# Patient Record
Sex: MALE | Race: WHITE | Employment: FULL TIME | URBAN - NONMETROPOLITAN AREA
[De-identification: names, ages, dates, MRNs, and addresses within clinical notes are randomized per-mention and may not be internally consistent; named-entity substitution may affect disease eponyms.]

---

## 2017-01-17 ENCOUNTER — OFFICE VISIT (OUTPATIENT)
Dept: INTERNAL MEDICINE | Age: 42
End: 2017-01-17

## 2017-01-17 VITALS
TEMPERATURE: 97.1 F | HEIGHT: 72 IN | SYSTOLIC BLOOD PRESSURE: 132 MMHG | BODY MASS INDEX: 35.08 KG/M2 | WEIGHT: 259 LBS | HEART RATE: 72 BPM | DIASTOLIC BLOOD PRESSURE: 80 MMHG

## 2017-01-17 DIAGNOSIS — R30.0 DYSURIA: Primary | ICD-10-CM

## 2017-01-17 DIAGNOSIS — R10.819 SUPRAPUBIC TENDERNESS: ICD-10-CM

## 2017-01-17 LAB
-: ABNORMAL
AMORPHOUS: ABNORMAL
BACTERIA: ABNORMAL
BILIRUBIN URINE: NEGATIVE
CASTS UA: ABNORMAL /LPF (ref 0–2)
COLOR: ABNORMAL
COMMENT UA: ABNORMAL
CRYSTALS, UA: ABNORMAL /HPF
EPITHELIAL CELLS UA: ABNORMAL /HPF (ref 0–5)
GLUCOSE URINE: NEGATIVE
KETONES, URINE: NEGATIVE
LEUKOCYTE ESTERASE, URINE: NEGATIVE
MUCUS: ABNORMAL
NITRITE, URINE: NEGATIVE
OTHER OBSERVATIONS UA: ABNORMAL
PH UA: 5.5 (ref 5–6)
PROTEIN UA: NEGATIVE
RBC UA: ABNORMAL /HPF (ref 0–4)
RENAL EPITHELIAL, UA: ABNORMAL /HPF
SPECIFIC GRAVITY UA: 1.03 (ref 1.01–1.02)
TRICHOMONAS: ABNORMAL
TURBIDITY: ABNORMAL
URINE HGB: ABNORMAL
UROBILINOGEN, URINE: NORMAL
WBC UA: ABNORMAL /HPF (ref 0–4)
YEAST: ABNORMAL

## 2017-01-17 PROCEDURE — 81001 URINALYSIS AUTO W/SCOPE: CPT | Performed by: NURSE PRACTITIONER

## 2017-01-17 PROCEDURE — 99213 OFFICE O/P EST LOW 20 MIN: CPT | Performed by: NURSE PRACTITIONER

## 2017-01-17 RX ORDER — CIPROFLOXACIN 250 MG/1
250 TABLET, FILM COATED ORAL 2 TIMES DAILY
Qty: 10 TABLET | Refills: 0 | Status: SHIPPED | OUTPATIENT
Start: 2017-01-17 | End: 2017-01-22

## 2017-01-18 ASSESSMENT — ENCOUNTER SYMPTOMS
ABDOMINAL PAIN: 1
VOMITING: 0
RECTAL PAIN: 0
NAUSEA: 0
SHORTNESS OF BREATH: 0
ANAL BLEEDING: 0
ABDOMINAL DISTENTION: 0
CONSTIPATION: 0
DIARRHEA: 0
BLOOD IN STOOL: 0

## 2017-05-05 RX ORDER — DULOXETIN HYDROCHLORIDE 30 MG/1
CAPSULE, DELAYED RELEASE ORAL
Qty: 90 CAPSULE | Refills: 3 | Status: SHIPPED | OUTPATIENT
Start: 2017-05-05 | End: 2017-06-30 | Stop reason: SDUPTHER

## 2017-06-27 LAB
ALBUMIN SERPL-MCNC: 4.5 G/DL (ref 3.5–5.2)
ALBUMIN/GLOBULIN RATIO: 1.6 (ref 1–2.5)
ALP BLD-CCNC: 80 U/L (ref 40–129)
ALT SERPL-CCNC: 33 U/L (ref 5–41)
ANION GAP SERPL CALCULATED.3IONS-SCNC: 8 MMOL/L (ref 9–17)
AST SERPL-CCNC: 24 U/L
BILIRUB SERPL-MCNC: 0.71 MG/DL (ref 0.3–1.2)
BUN BLDV-MCNC: 17 MG/DL (ref 6–20)
BUN/CREAT BLD: 20 (ref 9–20)
CALCIUM SERPL-MCNC: 9.8 MG/DL (ref 8.6–10.4)
CHLORIDE BLD-SCNC: 103 MMOL/L (ref 98–107)
CO2: 32 MMOL/L (ref 20–31)
CREAT SERPL-MCNC: 0.84 MG/DL (ref 0.7–1.2)
ESTIMATED AVERAGE GLUCOSE: 105 MG/DL
GFR AFRICAN AMERICAN: >60 ML/MIN
GFR NON-AFRICAN AMERICAN: >60 ML/MIN
GFR SERPL CREATININE-BSD FRML MDRD: ABNORMAL ML/MIN/{1.73_M2}
GFR SERPL CREATININE-BSD FRML MDRD: ABNORMAL ML/MIN/{1.73_M2}
GLUCOSE BLD-MCNC: 112 MG/DL (ref 70–99)
HBA1C MFR BLD: 5.3 % (ref 4.8–5.9)
POTASSIUM SERPL-SCNC: 5.2 MMOL/L (ref 3.7–5.3)
SODIUM BLD-SCNC: 143 MMOL/L (ref 135–144)
T3 FREE: 2.98 PG/ML (ref 2.02–4.43)
THYROXINE, FREE: 0.9 NG/DL (ref 0.93–1.7)
TOTAL PROTEIN: 7.3 G/DL (ref 6.4–8.3)
TSH SERPL DL<=0.05 MIU/L-ACNC: 1.39 MIU/L (ref 0.3–5)

## 2017-06-30 ENCOUNTER — OFFICE VISIT (OUTPATIENT)
Dept: INTERNAL MEDICINE | Age: 42
End: 2017-06-30
Payer: COMMERCIAL

## 2017-06-30 VITALS
HEART RATE: 60 BPM | WEIGHT: 251.4 LBS | HEIGHT: 72 IN | SYSTOLIC BLOOD PRESSURE: 122 MMHG | BODY MASS INDEX: 34.05 KG/M2 | DIASTOLIC BLOOD PRESSURE: 76 MMHG

## 2017-06-30 DIAGNOSIS — R73.01 IMPAIRED FASTING GLUCOSE: ICD-10-CM

## 2017-06-30 DIAGNOSIS — F32.9 MAJOR DEPRESSION, CHRONIC: Primary | ICD-10-CM

## 2017-06-30 DIAGNOSIS — E66.9 OBESITY (BMI 30-39.9): ICD-10-CM

## 2017-06-30 DIAGNOSIS — E55.9 VITAMIN D DEFICIENCY: ICD-10-CM

## 2017-06-30 DIAGNOSIS — E04.1 THYROID NODULE: ICD-10-CM

## 2017-06-30 DIAGNOSIS — Z98.84 STATUS POST BARIATRIC SURGERY: ICD-10-CM

## 2017-06-30 DIAGNOSIS — E78.2 MIXED HYPERLIPIDEMIA: ICD-10-CM

## 2017-06-30 PROCEDURE — 99214 OFFICE O/P EST MOD 30 MIN: CPT | Performed by: INTERNAL MEDICINE

## 2017-06-30 RX ORDER — DULOXETIN HYDROCHLORIDE 60 MG/1
60 CAPSULE, DELAYED RELEASE ORAL DAILY
Qty: 90 CAPSULE | Refills: 3 | Status: SHIPPED | OUTPATIENT
Start: 2017-06-30 | End: 2018-07-09

## 2017-06-30 ASSESSMENT — PATIENT HEALTH QUESTIONNAIRE - PHQ9
SUM OF ALL RESPONSES TO PHQ9 QUESTIONS 1 & 2: 2
2. FEELING DOWN, DEPRESSED OR HOPELESS: 1
SUM OF ALL RESPONSES TO PHQ QUESTIONS 1-9: 2
1. LITTLE INTEREST OR PLEASURE IN DOING THINGS: 1

## 2017-07-01 ASSESSMENT — ENCOUNTER SYMPTOMS
DOUBLE VISION: 0
EYE DISCHARGE: 0
DIARRHEA: 0
BLOOD IN STOOL: 0
SHORTNESS OF BREATH: 0
COUGH: 0
EYE PAIN: 0
BLURRED VISION: 0
SORE THROAT: 0
CONSTIPATION: 0
NAUSEA: 0
ABDOMINAL PAIN: 0
VOMITING: 0
WHEEZING: 0

## 2017-08-11 ENCOUNTER — OFFICE VISIT (OUTPATIENT)
Dept: PRIMARY CARE CLINIC | Age: 42
End: 2017-08-11
Payer: COMMERCIAL

## 2017-08-11 VITALS
TEMPERATURE: 98.7 F | BODY MASS INDEX: 34.27 KG/M2 | HEART RATE: 104 BPM | SYSTOLIC BLOOD PRESSURE: 122 MMHG | WEIGHT: 253 LBS | HEIGHT: 72 IN | OXYGEN SATURATION: 96 % | DIASTOLIC BLOOD PRESSURE: 82 MMHG

## 2017-08-11 DIAGNOSIS — S99.922A FOOT INJURY, LEFT, INITIAL ENCOUNTER: Primary | ICD-10-CM

## 2017-08-11 DIAGNOSIS — M79.672 FOOT PAIN, LEFT: ICD-10-CM

## 2017-08-11 PROCEDURE — 99213 OFFICE O/P EST LOW 20 MIN: CPT | Performed by: PHYSICIAN ASSISTANT

## 2017-08-14 ASSESSMENT — ENCOUNTER SYMPTOMS
SHORTNESS OF BREATH: 0
COLOR CHANGE: 0
COUGH: 0
NAUSEA: 0

## 2017-10-30 ENCOUNTER — TELEPHONE (OUTPATIENT)
Dept: INTERNAL MEDICINE | Age: 42
End: 2017-10-30

## 2017-10-30 DIAGNOSIS — Z23 NEED FOR HEPATITIS B VACCINATION: Primary | ICD-10-CM

## 2017-10-30 NOTE — TELEPHONE ENCOUNTER
Patient is needing the Hepatitis B series done for school. Please place order for patient to get done.

## 2017-12-22 ENCOUNTER — OFFICE VISIT (OUTPATIENT)
Dept: INTERNAL MEDICINE | Age: 42
End: 2017-12-22
Payer: COMMERCIAL

## 2017-12-22 VITALS
HEART RATE: 100 BPM | HEIGHT: 72 IN | WEIGHT: 265.4 LBS | SYSTOLIC BLOOD PRESSURE: 132 MMHG | BODY MASS INDEX: 35.95 KG/M2 | DIASTOLIC BLOOD PRESSURE: 80 MMHG

## 2017-12-22 DIAGNOSIS — E78.2 MIXED HYPERLIPIDEMIA: ICD-10-CM

## 2017-12-22 DIAGNOSIS — R41.3 MEMORY CHANGE: ICD-10-CM

## 2017-12-22 DIAGNOSIS — L40.9 PSORIASIS: ICD-10-CM

## 2017-12-22 DIAGNOSIS — E29.1 HYPOGONADISM MALE: ICD-10-CM

## 2017-12-22 DIAGNOSIS — R73.01 IMPAIRED FASTING GLUCOSE: ICD-10-CM

## 2017-12-22 DIAGNOSIS — E04.1 THYROID NODULE: ICD-10-CM

## 2017-12-22 DIAGNOSIS — F32.9 MAJOR DEPRESSION, CHRONIC: Primary | ICD-10-CM

## 2017-12-22 DIAGNOSIS — E55.9 VITAMIN D DEFICIENCY: ICD-10-CM

## 2017-12-22 DIAGNOSIS — Z98.84 STATUS POST BARIATRIC SURGERY: ICD-10-CM

## 2017-12-22 DIAGNOSIS — F32.A DEPRESSION, UNSPECIFIED DEPRESSION TYPE: Primary | ICD-10-CM

## 2017-12-22 PROCEDURE — 99214 OFFICE O/P EST MOD 30 MIN: CPT | Performed by: INTERNAL MEDICINE

## 2017-12-22 RX ORDER — BETAMETHASONE DIPROPIONATE 0.5 MG/G
LOTION TOPICAL
Qty: 60 ML | Refills: 0 | Status: SHIPPED | OUTPATIENT
Start: 2017-12-22 | End: 2019-02-07 | Stop reason: SDUPTHER

## 2017-12-26 NOTE — PROGRESS NOTES
Elba Bustos  YOB: 1975    Date of Service:  12/22/2017      HPI:  Annalise Gleason was seen in the internal medicine office today for:  Chief Complaint  Patient presents with  · Trouble with his mood, trouble with his focus or attention, trouble with his memory  · Blood sugar problem. · Psoriasis. · Weight problem. · and continued evaluation and management of chronic medical problems. We addressed the following new, acute or uncontrolled/unstable issues:    1. He has had trouble with depression and has been treated with Cymbalta. He thinks the Cymbalta, bumping it to 60, probably helped some but he is definitely not as good as he would like. In addition, he has noted some trouble with focus or attention and memory. He finds it frustrating. Much of the time his mood is pretty good but he fluctuates somewhat. This seems to have been building over the past 6 months or so. He would consider this to be a mild perhaps moderate problem. It does not seem to be improving and he has not really noticed anything that makes it better or worse. 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 2.  He has had more trouble with his psoriasis on his scalp. The dermatologist had given him a cream and a lotion which we refilled for him today but if this is not helping we talked about dermatology followup. We addressed the following chronic issues:  1. We talked about his weight and the weight loss surgery, his blood sugar and cholesterol. He has been trying to work on his diet. 2.  He is following with the endocrinologist with regard to the thyroid nodule and the vitamin D deficiency and the hypogonadism and that seems to be doing okay. Review of Systems:  Constitutional:  Negative for chills, fever, and weight loss. HENT:  Negative for congestion, ear pain, and sore throat.   Eyes:  Negative for blurred vision, double vision, pain and 9/6/13    preop wt 338.2lb    GERD (gastroesophageal reflux disease)     1) EGD 06/08 with minimal esophagitis, not confirmed with biopsy.  Hyperlipidemia     2% risk over 10 years on calculator December 2014    Hypertension     Hypogonadism male     1) Erectile dysfunction. 2) Gynecomastia. 3) Dr. Nivia Zhong, endocrinology, [facility] evaluation 07/08. 4) MRI of head 08/06.  Impaired fasting glucose     Obesity     BMI 45, 06/08., Bariatric surg 9/13    Obstructive sleep apnea     11/08, CPAP 10.- noncompliant 2016    Psoriasis     Treated with topical steroids Dr Rafi Parker   Betamethasone cream and lotion    Sensorineural hearing loss     1) Dr. Yecenia Ferguson evaluation, 08/06. MRI of head negative 08/06.  Thyroid nodule     right sided, fine needle aspiration 01/09 by Dr. Nivia Zhong negative. 1) Repeat ultrasound 06/10 stable  Dr. Nivia Zhong. 2) Fine needle aspiration repeat 10/11, negative.  Vitamin D deficiency     (Dr. Nivia Zhong). Family Hx and Social Hx    family history includes Arthritis in his maternal grandmother and mother; Asthma in his brother; Cancer in his sister; Depression in his mother; Diabetes in his maternal aunt and mother; Glaucoma in an other family member; Hearing Loss in his paternal grandfather; Heart Disease in his maternal grandfather and mother; High Blood Pressure in his maternal grandfather, maternal uncle, and mother; High Cholesterol in his maternal aunt, maternal uncle, and mother; Other in his mother; Thyroid Disease in his mother. History   Smoking Status    Never Smoker   Smokeless Tobacco    Never Used     History   Alcohol Use No     Comment: occassional, pt willing to avoid for at least 6 month post bariatric surgery       Physical Examination:  Constitutional:  He appears well-developed and well-nourished. No distress. HENT:  Head: Normocephalic and atraumatic.   Right Ear:  External ear normal.  Left Ear:  External ear normal.  Nose:  Nose MG 2.2 06/27/2017    VITD25 59.4 06/27/2017    IRON 115 06/27/2017    TIBC 288 06/27/2017     Lab Results   Component Value Date    CHOL 211 06/27/2017    TRIG 126 06/27/2017    HDL 49 06/27/2017    LDLCHOLESTEROL 137 06/27/2017       Assessment/Plan:    1. Major depression, chronic  2. Memory change  Some issues with his mood but he is having trouble with focus or attention and memory. I wonder a little bit about attention deficit. I think we should get psychiatry input. He is receptive to this. We are going to set him up in [place] for review of these symptoms before making any other changes. The Mini-Mental Status exam was completely normal.  We could consider neurocognitive testing but we are going to see how the psychiatrist feels initially. 3. Impaired fasting glucose  Working on diet. Blood sugar was 112. Needs to work aggressively on this and we will continue to monitor.  - Basic Metabolic Panel; Future    4. Status post bariatric surgery  His weight has increased. He needs to try to continue to focus on diet and we will monitor. He also has follow up with Dr. Emiliano Yan. - Vitamin D 25 Hydroxy; Future  - Vitamin B12; Future  - Vitamin A; Future  - Vitamin B1; Future    5. Psoriasis  I refilled the medications. If he is not making progress here we will need to get him back and see Dermatology. - betamethasone dipropionate 0.05 % lotion; APPLY TO AFFECTED AREA TWICE A DAY AS NEEDED  Dispense: 60 mL; Refill: 0  - betamethasone valerate (VALISONE) 0.1 % cream; APPLY TOPICALLY 2 TIMES DAILY AS NEEDED  Dispense: 45 g; Refill: 0    6. Thyroid nodule  7. Vitamin D deficiency  8. Hypogonadism male  Appears stable from this standpoint. He has follow up with Dr. Maria De Jesus Carbone. 9. Mixed hyperlipidemia  10-year risk is around 2%. He is working on diet. He will call if any problems prior to return.     Orders Placed This Encounter   Medications    betamethasone dipropionate 0.05 % lotion     Sig: APPLY TO AFFECTED AREA TWICE A DAY AS NEEDED     Dispense:  60 mL     Refill:  0    betamethasone valerate (VALISONE) 0.1 % cream     Sig: APPLY TOPICALLY 2 TIMES DAILY AS NEEDED     Dispense:  45 g     Refill:  0     Susanne TY am scribing for Julio Cesar Hidalgo MD 12/26/2017 at 11:35 AM.

## 2018-02-12 ENCOUNTER — PATIENT MESSAGE (OUTPATIENT)
Dept: INTERNAL MEDICINE | Age: 43
End: 2018-02-12

## 2018-02-12 RX ORDER — VALACYCLOVIR HYDROCHLORIDE 1 G/1
2 TABLET, FILM COATED ORAL 2 TIMES DAILY
Qty: 32 TABLET | Refills: 3 | Status: SHIPPED | OUTPATIENT
Start: 2018-02-12 | End: 2018-07-19

## 2018-02-12 NOTE — TELEPHONE ENCOUNTER
Charles Lyn To Sent  2/12/2018  1:10 PM   I would like to get a refill on Valtrex 1gm for cold sores.  If possible could I have a script for #32  with directions of 2 tablets every 12 hours for 2 doses.  That prescription [facility] be sent to [facility].  If you have any questions please contact my wife Brandon Haile) in the clinic pharmacy. John Maddox you.

## 2018-05-10 ENCOUNTER — HOSPITAL ENCOUNTER (OUTPATIENT)
Dept: LAB | Age: 43
Setting detail: SPECIMEN
Discharge: HOME | End: 2018-05-10
Payer: COMMERCIAL

## 2018-05-10 DIAGNOSIS — Z98.84 STATUS POST BARIATRIC SURGERY: ICD-10-CM

## 2018-05-10 DIAGNOSIS — R73.01 IMPAIRED FASTING GLUCOSE: ICD-10-CM

## 2018-05-10 LAB
ANION GAP SERPL CALCULATED.3IONS-SCNC: 9 MMOL/L (ref 9–17)
BUN BLDV-MCNC: 18 MG/DL (ref 6–20)
BUN/CREAT BLD: 21 (ref 9–20)
CALCIUM SERPL-MCNC: 9.6 MG/DL (ref 8.6–10.4)
CHLORIDE BLD-SCNC: 101 MMOL/L (ref 98–107)
CO2: 31 MMOL/L (ref 20–31)
CREAT SERPL-MCNC: 0.85 MG/DL (ref 0.7–1.2)
GFR AFRICAN AMERICAN: >60 ML/MIN
GFR NON-AFRICAN AMERICAN: >60 ML/MIN
GFR SERPL CREATININE-BSD FRML MDRD: ABNORMAL ML/MIN/{1.73_M2}
GFR SERPL CREATININE-BSD FRML MDRD: ABNORMAL ML/MIN/{1.73_M2}
GLUCOSE BLD-MCNC: 110 MG/DL (ref 70–99)
POTASSIUM SERPL-SCNC: 4.5 MMOL/L (ref 3.7–5.3)
SODIUM BLD-SCNC: 141 MMOL/L (ref 135–144)
VITAMIN B-12: >2000 PG/ML (ref 232–1245)
VITAMIN D 25-HYDROXY: 46.5 NG/ML (ref 30–100)

## 2018-05-10 PROCEDURE — 80048 BASIC METABOLIC PNL TOTAL CA: CPT

## 2018-05-10 PROCEDURE — 82607 VITAMIN B-12: CPT

## 2018-05-10 PROCEDURE — 36415 COLL VENOUS BLD VENIPUNCTURE: CPT

## 2018-05-10 PROCEDURE — 84425 ASSAY OF VITAMIN B-1: CPT

## 2018-05-10 PROCEDURE — 84590 ASSAY OF VITAMIN A: CPT

## 2018-05-10 PROCEDURE — 82306 VITAMIN D 25 HYDROXY: CPT

## 2018-05-13 LAB
RETINYL PALMITATE: <0.02 MG/L (ref 0–0.1)
VITAMIN A LEVEL: 0.43 MG/L (ref 0.3–1.2)
VITAMIN A, INTERP: NORMAL

## 2018-05-15 LAB — VITAMIN B1 WHOLE BLOOD: 132 NMOL/L (ref 70–180)

## 2018-05-17 DIAGNOSIS — L40.9 PSORIASIS: ICD-10-CM

## 2018-05-18 RX ORDER — BETAMETHASONE DIPROPIONATE 0.5 MG/G
LOTION TOPICAL
Qty: 60 ML | Refills: 0 | OUTPATIENT
Start: 2018-05-18

## 2018-07-09 RX ORDER — DULOXETIN HYDROCHLORIDE 30 MG/1
CAPSULE, DELAYED RELEASE ORAL
Qty: 180 CAPSULE | Refills: 3 | Status: SHIPPED | OUTPATIENT
Start: 2018-07-09 | End: 2019-05-23 | Stop reason: SDUPTHER

## 2018-07-19 ENCOUNTER — OFFICE VISIT (OUTPATIENT)
Dept: OPHTHALMOLOGY | Age: 43
End: 2018-07-19
Payer: COMMERCIAL

## 2018-07-19 DIAGNOSIS — B00.52 HERPES SIMPLEX VIRUS (HSV) EPITHELIAL KERATITIS: Primary | ICD-10-CM

## 2018-07-19 PROCEDURE — 99214 OFFICE O/P EST MOD 30 MIN: CPT | Performed by: OPHTHALMOLOGY

## 2018-07-19 RX ORDER — VALACYCLOVIR HYDROCHLORIDE 500 MG/1
1000 TABLET, FILM COATED ORAL DAILY
Qty: 30 TABLET | Refills: 5 | Status: SHIPPED | OUTPATIENT
Start: 2018-07-19 | End: 2018-08-07 | Stop reason: ALTCHOICE

## 2018-07-19 ASSESSMENT — SLIT LAMP EXAM - LIDS
COMMENTS: MILD BLEPHARITIS. MILD MGD
COMMENTS: MILD BLEPHARITIS. MILD MGD

## 2018-07-19 ASSESSMENT — TONOMETRY
OD_IOP_MMHG: 14
OS_IOP_MMHG: 14

## 2018-07-19 ASSESSMENT — VISUAL ACUITY
METHOD: SNELLEN - LINEAR
OS_CC: 20/20
CORRECTION_TYPE: GLASSES

## 2018-07-19 NOTE — PROGRESS NOTES
5 times a day. Aggressive lubrication. Follow.     Electronically signed by Rosaline Fagan MD on 7/19/2018 at 2:49 PM

## 2018-07-24 ENCOUNTER — OFFICE VISIT (OUTPATIENT)
Dept: OPHTHALMOLOGY | Age: 43
End: 2018-07-24
Payer: COMMERCIAL

## 2018-07-24 DIAGNOSIS — B00.52 HERPES SIMPLEX VIRUS (HSV) EPITHELIAL KERATITIS: Primary | ICD-10-CM

## 2018-07-24 PROCEDURE — 99213 OFFICE O/P EST LOW 20 MIN: CPT | Performed by: OPHTHALMOLOGY

## 2018-07-24 ASSESSMENT — TONOMETRY
IOP_METHOD: NON-CONTACT AIR PUFF
OS_IOP_MMHG: 13
OD_IOP_MMHG: 14

## 2018-07-24 ASSESSMENT — SLIT LAMP EXAM - LIDS
COMMENTS: MILD BLEPHARITIS. MILD MGD
COMMENTS: MILD BLEPHARITIS. MILD MGD

## 2018-07-24 ASSESSMENT — VISUAL ACUITY
CORRECTION_TYPE: GLASSES
METHOD: SNELLEN - LINEAR
OS_CC: 20/20

## 2018-07-24 NOTE — PROGRESS NOTES
nodule     right sided, fine needle aspiration 01/09 by Dr. Francoise Donald negative. 1) Repeat ultrasound 06/10 stable  Dr. Francoise Donald. 2) Fine needle aspiration repeat 10/11, negative.  Vitamin D deficiency     (Dr. Francoise Donald). Current Outpatient Prescriptions:     valACYclovir (VALTREX) 500 MG tablet, Take 2 tablets by mouth daily, Disp: 30 tablet, Rfl: 5    ganciclovir (ZIRGAN) 0.15 % GEL ophthalmic gel, Place 1 each into the left eye every 3 hours, Disp: 1 Tube, Rfl: 1    DULoxetine (CYMBALTA) 30 MG extended release capsule, TAKE 2 CAPSULES EVERY DAY, Disp: 180 capsule, Rfl: 3    betamethasone dipropionate 0.05 % lotion, APPLY TO AFFECTED AREA TWICE A DAY AS NEEDED, Disp: 60 mL, Rfl: 0    betamethasone valerate (VALISONE) 0.1 % cream, APPLY TOPICALLY 2 TIMES DAILY AS NEEDED, Disp: 45 g, Rfl: 0    Multiple Vitamin (MVI, CELEBRATE, CHEWABLE TABLET), Take 1 tablet by mouth 2 times daily. , Disp: , Rfl:     CALCIUM CITRATE, Take 1,500 mg by mouth daily Indications: bariatric adv , Disp: , Rfl:      Allergies   Allergen Reactions    Effexor [Venlafaxine] Other (See Comments)     Muscle spasms    Shellfish-Derived Products Swelling     Throat swelling    Morphine Hives and Nausea Only     nausea        IMPRESSION:  1. Herpes simplex virus (HSV) epithelial keratitis        PLAN:    1. Herpes simplex virus (HSV) epithelial keratitis    Significant improvement OS from prior visit. Continue  Valtrex 1gm PO QD. Apply Zirgan OS TID. Follow.     Electronically signed by Phill Calle MD on 7/24/2018 at 3:28 PM

## 2018-07-31 ENCOUNTER — OFFICE VISIT (OUTPATIENT)
Dept: INTERNAL MEDICINE | Age: 43
End: 2018-07-31
Payer: COMMERCIAL

## 2018-07-31 VITALS
WEIGHT: 257 LBS | BODY MASS INDEX: 34.81 KG/M2 | SYSTOLIC BLOOD PRESSURE: 136 MMHG | HEIGHT: 72 IN | DIASTOLIC BLOOD PRESSURE: 72 MMHG | HEART RATE: 84 BPM

## 2018-07-31 DIAGNOSIS — R73.01 IMPAIRED FASTING GLUCOSE: Primary | ICD-10-CM

## 2018-07-31 DIAGNOSIS — Z98.84 STATUS POST BARIATRIC SURGERY: ICD-10-CM

## 2018-07-31 DIAGNOSIS — E55.9 VITAMIN D DEFICIENCY: ICD-10-CM

## 2018-07-31 DIAGNOSIS — B00.52 HERPES SIMPLEX VIRUS (HSV) EPITHELIAL KERATITIS: ICD-10-CM

## 2018-07-31 DIAGNOSIS — F32.9 MAJOR DEPRESSION, CHRONIC: ICD-10-CM

## 2018-07-31 DIAGNOSIS — E29.1 HYPOGONADISM MALE: ICD-10-CM

## 2018-07-31 DIAGNOSIS — E78.2 MIXED HYPERLIPIDEMIA: ICD-10-CM

## 2018-07-31 DIAGNOSIS — E04.1 THYROID NODULE: ICD-10-CM

## 2018-07-31 DIAGNOSIS — E66.9 OBESITY (BMI 30-39.9): ICD-10-CM

## 2018-07-31 PROCEDURE — 99214 OFFICE O/P EST MOD 30 MIN: CPT | Performed by: INTERNAL MEDICINE

## 2018-08-02 ENCOUNTER — OFFICE VISIT (OUTPATIENT)
Dept: OPHTHALMOLOGY | Age: 43
End: 2018-08-02
Payer: COMMERCIAL

## 2018-08-02 DIAGNOSIS — B00.52 HERPES SIMPLEX VIRUS (HSV) EPITHELIAL KERATITIS: Primary | ICD-10-CM

## 2018-08-02 DIAGNOSIS — H31.001 CHORIORETINAL SCAR OF RIGHT EYE: ICD-10-CM

## 2018-08-02 DIAGNOSIS — H25.813 COMBINED FORMS OF AGE-RELATED CATARACT OF BOTH EYES: ICD-10-CM

## 2018-08-02 PROCEDURE — 99214 OFFICE O/P EST MOD 30 MIN: CPT | Performed by: OPHTHALMOLOGY

## 2018-08-02 PROCEDURE — 92250 FUNDUS PHOTOGRAPHY W/I&R: CPT | Performed by: OPHTHALMOLOGY

## 2018-08-02 RX ORDER — TROPICAMIDE 10 MG/ML
1 SOLUTION/ DROPS OPHTHALMIC ONCE
Status: COMPLETED | OUTPATIENT
Start: 2018-08-02 | End: 2018-08-02

## 2018-08-02 RX ORDER — PHENYLEPHRINE HCL 2.5 %
1 DROPS OPHTHALMIC (EYE) ONCE
Status: COMPLETED | OUTPATIENT
Start: 2018-08-02 | End: 2018-08-02

## 2018-08-02 RX ADMIN — TROPICAMIDE 1 DROP: 10 SOLUTION/ DROPS OPHTHALMIC at 10:42

## 2018-08-02 RX ADMIN — Medication 1 DROP: at 10:41

## 2018-08-02 ASSESSMENT — SLIT LAMP EXAM - LIDS
COMMENTS: MILD BLEPHARITIS. MILD MGD
COMMENTS: MILD BLEPHARITIS. MILD MGD

## 2018-08-02 ASSESSMENT — TONOMETRY
OS_IOP_MMHG: 15
IOP_METHOD: PNEUMO-TONOMETER
OD_IOP_MMHG: 17

## 2018-08-02 ASSESSMENT — VISUAL ACUITY
METHOD: SNELLEN - LINEAR
CORRECTION_TYPE: GLASSES
OS_CC: 20/20

## 2018-08-02 NOTE — PROGRESS NOTES
glucose     Obesity     BMI 45, 06/08., Bariatric surg 9/13    Obstructive sleep apnea     11/08, CPAP 10.- noncompliant 2016    Psoriasis     Treated with topical steroids Dr Luis Enrique Leary   Betamethasone cream and lotion    Sensorineural hearing loss     1) Dr. La Mobley evaluation, 08/06. MRI of head negative 08/06.  Thyroid nodule     right sided, fine needle aspiration 01/09 by Dr. Clint Ramirez negative. 1) Repeat ultrasound 06/10 stable  Dr. Clint Ramirez. 2) Fine needle aspiration repeat 10/11, negative.  Vitamin D deficiency     (Dr. Clint Ramirez). Current Outpatient Prescriptions:     valACYclovir (VALTREX) 500 MG tablet, Take 2 tablets by mouth daily, Disp: 30 tablet, Rfl: 5    DULoxetine (CYMBALTA) 30 MG extended release capsule, TAKE 2 CAPSULES EVERY DAY, Disp: 180 capsule, Rfl: 3    betamethasone dipropionate 0.05 % lotion, APPLY TO AFFECTED AREA TWICE A DAY AS NEEDED, Disp: 60 mL, Rfl: 0    betamethasone valerate (VALISONE) 0.1 % cream, APPLY TOPICALLY 2 TIMES DAILY AS NEEDED, Disp: 45 g, Rfl: 0    Multiple Vitamin (MVI, CELEBRATE, CHEWABLE TABLET), Take 1 tablet by mouth 2 times daily. , Disp: , Rfl:     CALCIUM CITRATE, Take 1,500 mg by mouth daily Indications: bariatric adv , Disp: , Rfl:      Allergies   Allergen Reactions    Effexor [Venlafaxine] Other (See Comments)     Muscle spasms    Shellfish-Derived Products Swelling     Throat swelling    Morphine Hives and Nausea Only     nausea        IMPRESSION:  1. Herpes simplex virus (HSV) epithelial keratitis    2. Chorioretinal scar of right eye    3. Combined forms of age-related cataract of both eyes        PLAN:    1. Herpes simplex virus (HSV) epithelial keratitis    Healed OS K Epithelium. D/C Zirgan. D/C oral Valacyclovir. Follow. 2. Chorioretinal scar of right eye    Uncertain etiology. No evidence of SRCNVM. Follow. 3. Combined forms of age-related cataract of both eyes    Counseled pt regarding Cataracts.     Counseled pt regarding the importance of routine   ophthalmic examinations to monitor cataracts. Advised pt to exercise caution while operating a   motor vehicle or performing other critical visual tasks. Follow.     Electronically signed by Lina Manzano MD on 8/2/2018 at 9:37 AM

## 2018-08-06 ENCOUNTER — OFFICE VISIT (OUTPATIENT)
Dept: OPHTHALMOLOGY | Age: 43
End: 2018-08-06
Payer: COMMERCIAL

## 2018-08-06 DIAGNOSIS — B00.52 HERPES SIMPLEX VIRUS (HSV) EPITHELIAL KERATITIS: Primary | ICD-10-CM

## 2018-08-06 PROCEDURE — 99213 OFFICE O/P EST LOW 20 MIN: CPT | Performed by: OPHTHALMOLOGY

## 2018-08-06 ASSESSMENT — VISUAL ACUITY
METHOD: SNELLEN - LINEAR
CORRECTION_TYPE: GLASSES
OS_CC: 20/20

## 2018-08-06 ASSESSMENT — TONOMETRY
OD_IOP_MMHG: 16
IOP_METHOD: NON-CONTACT AIR PUFF
OS_IOP_MMHG: 13

## 2018-08-06 ASSESSMENT — SLIT LAMP EXAM - LIDS
COMMENTS: MILD BLEPHARITIS. MILD MGD
COMMENTS: MILD BLEPHARITIS. MILD MGD

## 2018-08-07 ENCOUNTER — OFFICE VISIT (OUTPATIENT)
Dept: OPHTHALMOLOGY | Age: 43
End: 2018-08-07
Payer: COMMERCIAL

## 2018-08-07 DIAGNOSIS — B00.52 HERPES SIMPLEX VIRUS (HSV) EPITHELIAL KERATITIS: Primary | ICD-10-CM

## 2018-08-07 PROCEDURE — 99213 OFFICE O/P EST LOW 20 MIN: CPT | Performed by: OPHTHALMOLOGY

## 2018-08-07 RX ORDER — MOXIFLOXACIN 5 MG/ML
1 SOLUTION/ DROPS OPHTHALMIC 4 TIMES DAILY
Qty: 1 BOTTLE | Refills: 1 | Status: SHIPPED | OUTPATIENT
Start: 2018-08-07 | End: 2018-08-14

## 2018-08-07 RX ORDER — ACYCLOVIR 400 MG/1
400 TABLET ORAL
Qty: 50 TABLET | Refills: 2 | Status: SHIPPED | OUTPATIENT
Start: 2018-08-07 | End: 2018-08-17

## 2018-08-07 ASSESSMENT — VISUAL ACUITY
METHOD: SNELLEN - LINEAR
OS_CC: 20/20
CORRECTION_TYPE: GLASSES

## 2018-08-07 ASSESSMENT — SLIT LAMP EXAM - LIDS
COMMENTS: MILD BLEPHARITIS. MILD MGD
COMMENTS: MILD BLEPHARITIS. MILD MGD

## 2018-08-07 ASSESSMENT — TONOMETRY
OS_IOP_MMHG: 12
OD_IOP_MMHG: 15
IOP_METHOD: NON-CONTACT AIR PUFF

## 2018-08-07 NOTE — PROGRESS NOTES
Walt Turner is a 37 y.o. male here for a complete eye exam.      Chief Complaint   Patient presents with    Follow-up       HPI     1 day follow up, OS-keratitis, patient states eye is worse today with burning, tenderness, scratchy and very uncomfortable. Patient took valtrex starting yesterday, patient using systane drops          ROS      Main Ophthalmology Exam     Slit Lamp Exam       Right Left    Lids/Lashes Mild Blepharitis. Mild MGD Mild Blepharitis. Mild MGD    Conjunctiva/Sclera Clear and white 3+ Injection, 1+ Chemosis    Cornea Clear Approx 3mm x 1mm area of abrasion inferiorly with stromal edema    Anterior Chamber Deep, no cells, no flare Deep, no cells, no flare    Iris Round and reactive Round and reactive    Lens 1+ Nuclear sclerosis, 1+ Cortical cataract 1+ Nuclear sclerosis, 1+ Cortical cataract                   Tonometry     Tonometry (Non-contact air puff, 4:10 PM)       Right Left    Pressure 15 12              Visual Acuity     Visual Acuity (Snellen - Linear)       Right Left    Dist cc 20/20 20/20    Correction:  Glasses               Not recorded          Not recorded          Not recorded         Not recorded          Past Medical History:   Diagnosis Date    Depression     /obsessive-compulsive disorder, Dr. Donya Almonte.  Gastric bypass status for obesity 9/6/13    preop wt 338.2lb    GERD (gastroesophageal reflux disease)     1) EGD 06/08 with minimal esophagitis, not confirmed with biopsy.  Hyperlipidemia     2% risk over 10 years on calculator December 2014    Hypertension     Hypogonadism male     1) Erectile dysfunction. 2) Gynecomastia. 3) Dr. Claude Nicole, endocrinology, [facility] evaluation 07/08. 4) MRI of head 08/06.      Impaired fasting glucose     Obesity     BMI 45, 06/08., Bariatric surg 9/13    Obstructive sleep apnea     11/08, CPAP 10.- noncompliant 2016    Psoriasis     Treated with topical steroids Dr Sid Veliz   Betamethasone cream and lotion    Sensorineural hearing loss     1) Dr. Kanika Sr evaluation, 08/06. MRI of head negative 08/06.  Thyroid nodule     right sided, fine needle aspiration 01/09 by Dr. Christopher Estrada negative. 1) Repeat ultrasound 06/10 stable  Dr. Christopher Estrada. 2) Fine needle aspiration repeat 10/11, negative.  Vitamin D deficiency     (Dr. Christopher Estrada). Current Outpatient Prescriptions:     valACYclovir (VALTREX) 500 MG tablet, Take 2 tablets by mouth daily, Disp: 30 tablet, Rfl: 5    DULoxetine (CYMBALTA) 30 MG extended release capsule, TAKE 2 CAPSULES EVERY DAY, Disp: 180 capsule, Rfl: 3    betamethasone dipropionate 0.05 % lotion, APPLY TO AFFECTED AREA TWICE A DAY AS NEEDED, Disp: 60 mL, Rfl: 0    betamethasone valerate (VALISONE) 0.1 % cream, APPLY TOPICALLY 2 TIMES DAILY AS NEEDED, Disp: 45 g, Rfl: 0    Multiple Vitamin (MVI, CELEBRATE, CHEWABLE TABLET), Take 1 tablet by mouth 2 times daily. , Disp: , Rfl:     CALCIUM CITRATE, Take 1,500 mg by mouth daily Indications: bariatric adv , Disp: , Rfl:      Allergies   Allergen Reactions    Effexor [Venlafaxine] Other (See Comments)     Muscle spasms    Shellfish-Derived Products Swelling     Throat swelling    Morphine Hives and Nausea Only     nausea        IMPRESSION:  1. Herpes simplex virus (HSV) epithelial keratitis        PLAN:    1. Herpes simplex virus (HSV) epithelial keratitis    Recent episode of HSV Epithelitis OS resolved 02 Aug 2018. Pt had taken oral Valtrex and Zirgan ointment. Pt presented 06 Aug 2018 with increasing OS K pain and  was found to have K edema. Pt advised to use oral antivirals  only and OTC eye ointment OS BID. Pt presents 07 Aug 2018 with OS worsening pain and discomfort. SLE demonstrated K Abrasion with K edema. D/C Valtrex. Hold eye ointment OS. BSCL on. Start Acyclovir 400mg PO five times a day. Start Besivance OS QID. Pt will follow-up with Cornea Specialist, Dr. Sis Arriaga, on 08 Aug 2018.     Electronically signed by

## 2018-08-07 NOTE — PROGRESS NOTES
Phillip Luna is a 37 y.o. male here for a complete eye exam.      Chief Complaint   Patient presents with    Eye Problem       HPI     Patent had been seen for keratitis in OS on 07/19/2018 and prescribed valtrex. Patient was on 08/02/2018 and  taken off of valtrex because eye was doing better. Patient states he woke up Saturday morning and OS was irritated and it  has progressively gotten worse since then. He is having light sensitivity, blurred vision, tender and drainage. ROS      Main Ophthalmology Exam     Slit Lamp Exam       Right Left    Lids/Lashes Mild Blepharitis. Mild MGD Mild Blepharitis. Mild MGD    Conjunctiva/Sclera Clear and white Clear and white    Cornea Clear Lower 25% of K with Edema, epithelial staining, opacification    Anterior Chamber Deep, no cells, no flare Deep, no cells, no flare    Iris Round and reactive Round and reactive    Lens 1+ Nuclear sclerosis, 1+ Cortical cataract 1+ Nuclear sclerosis, 1+ Cortical cataract                   Tonometry     Tonometry (Non-contact air puff, 2:27 PM)       Right Left    Pressure 16 13              Visual Acuity     Visual Acuity (Snellen - Linear)       Right Left    Dist cc 20/20 20/20    Correction:  Glasses               Not recorded          Not recorded          Not recorded         Not recorded          Past Medical History:   Diagnosis Date    Depression     /obsessive-compulsive disorder, Dr. Marleny Saucedo.  Gastric bypass status for obesity 9/6/13    preop wt 338.2lb    GERD (gastroesophageal reflux disease)     1) EGD 06/08 with minimal esophagitis, not confirmed with biopsy.  Hyperlipidemia     2% risk over 10 years on calculator December 2014    Hypertension     Hypogonadism male     1) Erectile dysfunction. 2) Gynecomastia. 3) Dr. Evi Sandhu, endocrinology, [facility] evaluation 07/08. 4) MRI of head 08/06.      Impaired fasting glucose     Obesity     BMI 45, 06/08., Bariatric surg 9/13    Obstructive sleep apnea

## 2018-08-16 ENCOUNTER — OFFICE VISIT (OUTPATIENT)
Dept: OPHTHALMOLOGY | Age: 43
End: 2018-08-16
Payer: COMMERCIAL

## 2018-08-16 DIAGNOSIS — B00.52 HERPES SIMPLEX VIRUS (HSV) EPITHELIAL KERATITIS: ICD-10-CM

## 2018-08-16 DIAGNOSIS — H16.042 MARGINAL CORNEAL ULCER OF LEFT EYE: Primary | ICD-10-CM

## 2018-08-16 PROCEDURE — 99213 OFFICE O/P EST LOW 20 MIN: CPT | Performed by: OPHTHALMOLOGY

## 2018-08-16 ASSESSMENT — SLIT LAMP EXAM - LIDS
COMMENTS: MILD BLEPHARITIS. MILD MGD
COMMENTS: MILD BLEPHARITIS. MILD MGD

## 2018-08-16 ASSESSMENT — VISUAL ACUITY
METHOD: SNELLEN - LINEAR
OS_CC: 20/20
CORRECTION_TYPE: GLASSES

## 2018-08-16 ASSESSMENT — TONOMETRY
OS_IOP_MMHG: 15
OD_IOP_MMHG: 15
IOP_METHOD: NON-CONTACT AIR PUFF

## 2018-08-16 NOTE — PROGRESS NOTES
Davis Hudson is a 37 y.o. male here for a complete eye exam.      Chief Complaint   Patient presents with    Follow-up       HPI     1 week follow up for herpes simplex keratitis OS & from seeing Dr William Mackay, patient states eye are doing much better, but still having some blurriness,  Patient takingAcyclovir 400mg PO five times a day and both eye drops prescribed, he also has bandage contact in. Last Rx- 07/15          ROS      Main Ophthalmology Exam     Slit Lamp Exam       Right Left    Lids/Lashes Mild Blepharitis. Mild MGD Mild Blepharitis. Mild MGD    Conjunctiva/Sclera Clear and white White and quiet    Cornea Clear small area of linear epithelial irregularity inferioly    Anterior Chamber Deep, no cells, no flare Deep, no cells, no flare    Iris Round and reactive Round and reactive    Lens 1+ Nuclear sclerosis, 1+ Cortical cataract 1+ Nuclear sclerosis, 1+ Cortical cataract                   Tonometry     Tonometry (Non-contact air puff, 9:39 AM)       Right Left    Pressure 15 15              Visual Acuity     Visual Acuity (Snellen - Linear)       Right Left    Dist cc 20/20 20/20    Correction:  Glasses               Not recorded          Not recorded          Not recorded         Not recorded          Past Medical History:   Diagnosis Date    Depression     /obsessive-compulsive disorder, Dr. Misha Flynn.  Gastric bypass status for obesity 9/6/13    preop wt 338.2lb    GERD (gastroesophageal reflux disease)     1) EGD 06/08 with minimal esophagitis, not confirmed with biopsy.  Hyperlipidemia     2% risk over 10 years on calculator December 2014    Hypertension     Hypogonadism male     1) Erectile dysfunction. 2) Gynecomastia. 3) Dr. Chintan Mejias, endocrinology, [facility] evaluation 07/08. 4) MRI of head 08/06.      Impaired fasting glucose     Obesity     BMI 45, 06/08., Bariatric surg 9/13    Obstructive sleep apnea     11/08, CPAP 10.- noncompliant 2016    Psoriasis     Treated with (HSV) epithelial keratitis    As above.     Electronically signed by Phill Calle MD on 8/16/2018 at 10:37 AM

## 2018-09-14 ENCOUNTER — OFFICE VISIT (OUTPATIENT)
Dept: OPHTHALMOLOGY | Age: 43
End: 2018-09-14
Payer: COMMERCIAL

## 2018-09-14 DIAGNOSIS — H16.042 MARGINAL CORNEAL ULCER OF LEFT EYE: Primary | ICD-10-CM

## 2018-09-14 PROCEDURE — 99213 OFFICE O/P EST LOW 20 MIN: CPT | Performed by: OPHTHALMOLOGY

## 2018-09-14 RX ORDER — ACYCLOVIR 400 MG/1
400 TABLET ORAL 2 TIMES DAILY
Qty: 60 TABLET | Refills: 5 | Status: SHIPPED | OUTPATIENT
Start: 2018-09-14 | End: 2019-03-25 | Stop reason: SDUPTHER

## 2018-09-14 ASSESSMENT — TONOMETRY
IOP_METHOD: NON-CONTACT AIR PUFF
OS_IOP_MMHG: 14
OD_IOP_MMHG: 13

## 2018-09-14 ASSESSMENT — SLIT LAMP EXAM - LIDS
COMMENTS: MILD BLEPHARITIS. MILD MGD
COMMENTS: MILD BLEPHARITIS. MILD MGD

## 2018-09-14 ASSESSMENT — VISUAL ACUITY
OS_CC: 20/20
METHOD: SNELLEN - LINEAR

## 2018-09-14 NOTE — PROGRESS NOTES
Walt Turner is a 37 y.o. male here for a complete eye exam.      Chief Complaint   Patient presents with    Follow-up       HPI     1 month follow up for corneal ulcer  Patient states he saw Dr Luis Augustine 2 weeks ago and stated his eye was getting better. No new eye pain in either eye. ROS      Main Ophthalmology Exam     Slit Lamp Exam       Right Left    Lids/Lashes Mild Blepharitis. Mild MGD Mild Blepharitis. Mild MGD    Conjunctiva/Sclera Clear and white White and quiet    Cornea Clear Clear    Anterior Chamber Deep, no cells, no flare Deep, no cells, no flare    Iris Round and reactive Round and reactive    Lens 1+ Nuclear sclerosis, 1+ Cortical cataract 1+ Nuclear sclerosis, 1+ Cortical cataract                   Tonometry     Tonometry (Non-contact air puff, 4:17 PM)       Right Left    Pressure 13 14              Visual Acuity     Visual Acuity (Snellen - Linear)       Right Left    Dist cc 20/20 20/20               Not recorded          Not recorded          Not recorded         Not recorded          Past Medical History:   Diagnosis Date    Depression     /obsessive-compulsive disorder, Dr. Donya Almonte.  Gastric bypass status for obesity 9/6/13    preop wt 338.2lb    GERD (gastroesophageal reflux disease)     1) EGD 06/08 with minimal esophagitis, not confirmed with biopsy.  Hyperlipidemia     2% risk over 10 years on calculator December 2014    Hypertension     Hypogonadism male     1) Erectile dysfunction. 2) Gynecomastia. 3) Dr. Claude Nicole, endocrinology, [facility] evaluation 07/08. 4) MRI of head 08/06.  Impaired fasting glucose     Obesity     BMI 45, 06/08., Bariatric surg 9/13    Obstructive sleep apnea     11/08, CPAP 10.- noncompliant 2016    Psoriasis     Treated with topical steroids Dr Sid Veliz   Betamethasone cream and lotion    Sensorineural hearing loss     1) Dr. Ricki Bingham evaluation, 08/06. MRI of head negative 08/06.      Thyroid nodule     right sided, fine needle aspiration 01/09 by Dr. Violeta Davalos negative. 1) Repeat ultrasound 06/10 stable  Dr. Violeta Davalos. 2) Fine needle aspiration repeat 10/11, negative.  Vitamin D deficiency     (Dr. Violeta Davalos). Current Outpatient Prescriptions:     Tobramycin-Dexamethasone 0.3-0.05 % SUSP, Apply 1 drop to eye three times daily Left Eye - Taper over one month., Disp: 1 Bottle, Rfl: 1    DULoxetine (CYMBALTA) 30 MG extended release capsule, TAKE 2 CAPSULES EVERY DAY, Disp: 180 capsule, Rfl: 3    betamethasone dipropionate 0.05 % lotion, APPLY TO AFFECTED AREA TWICE A DAY AS NEEDED, Disp: 60 mL, Rfl: 0    betamethasone valerate (VALISONE) 0.1 % cream, APPLY TOPICALLY 2 TIMES DAILY AS NEEDED, Disp: 45 g, Rfl: 0    Multiple Vitamin (MVI, CELEBRATE, CHEWABLE TABLET), Take 1 tablet by mouth 2 times daily. , Disp: , Rfl:     CALCIUM CITRATE, Take 1,500 mg by mouth daily Indications: bariatric adv , Disp: , Rfl:      Allergies   Allergen Reactions    Effexor [Venlafaxine] Other (See Comments)     Muscle spasms    Shellfish-Derived Products Swelling     Throat swelling    Morphine Hives and Nausea Only     nausea        IMPRESSION:  1. Marginal corneal ulcer of left eye        PLAN:    1. Marginal corneal ulcer of left eye    Pt had HSV Keratitis OS that was controlled with PO Valacyclovir. Pt changed to Acyclovir 400mg PO 5 times a day. Pt subsequently developed OS Staph Marginal Keratitis that has  responded to Eyelid Hygiene, BSCL, and Tobradex. Pt states that OS is free of symptoms today. D/C Tobradex OS when cleared by Dr. Sherryle Simon. Continue Acyclovir to 400mg po BID. Cont Eyelid Hygiene with Ocuscrub Plus OU BID. Cont aggressive lubrication OU. Pt will see Dr. Sherryle Simon in 1-2 weeks.     Electronically signed by Bird Rodriguez MD on 9/14/2018 at 4:25 PM

## 2019-02-02 ENCOUNTER — HOSPITAL ENCOUNTER (OUTPATIENT)
Dept: LAB | Age: 44
Discharge: HOME | End: 2019-02-02
Payer: COMMERCIAL

## 2019-02-02 DIAGNOSIS — E78.2 MIXED HYPERLIPIDEMIA: ICD-10-CM

## 2019-02-02 DIAGNOSIS — R73.01 IMPAIRED FASTING GLUCOSE: ICD-10-CM

## 2019-02-02 LAB
ALT SERPL-CCNC: 32 U/L (ref 5–41)
ALT SERPL-CCNC: 32 U/L (ref 5–41)
ANION GAP SERPL CALCULATED.3IONS-SCNC: 12 MMOL/L (ref 9–17)
AST SERPL-CCNC: 24 U/L
BUN BLDV-MCNC: 18 MG/DL (ref 6–20)
BUN/CREAT BLD: 21 (ref 9–20)
CALCIUM SERPL-MCNC: 9.8 MG/DL (ref 8.6–10.4)
CHLORIDE BLD-SCNC: 98 MMOL/L (ref 98–107)
CHOLESTEROL/HDL RATIO: 5
CHOLESTEROL: 222 MG/DL
CO2: 29 MMOL/L (ref 20–31)
CREAT SERPL-MCNC: 0.87 MG/DL (ref 0.7–1.2)
CREAT SERPL-MCNC: 0.87 MG/DL (ref 0.7–1.2)
ESTIMATED AVERAGE GLUCOSE: 100 MG/DL
ESTIMATED AVERAGE GLUCOSE: 100 MG/DL
GFR AFRICAN AMERICAN: >60 ML/MIN
GFR AFRICAN AMERICAN: >60 ML/MIN
GFR NON-AFRICAN AMERICAN: >60 ML/MIN
GFR NON-AFRICAN AMERICAN: >60 ML/MIN
GFR SERPL CREATININE-BSD FRML MDRD: ABNORMAL ML/MIN/{1.73_M2}
GFR SERPL CREATININE-BSD FRML MDRD: ABNORMAL ML/MIN/{1.73_M2}
GFR SERPL CREATININE-BSD FRML MDRD: NORMAL ML/MIN/{1.73_M2}
GFR SERPL CREATININE-BSD FRML MDRD: NORMAL ML/MIN/{1.73_M2}
GLUCOSE BLD-MCNC: 114 MG/DL (ref 70–99)
HBA1C MFR BLD: 5.1 % (ref 4.8–5.9)
HBA1C MFR BLD: 5.1 % (ref 4.8–5.9)
HDLC SERPL-MCNC: 44 MG/DL
LDL CHOLESTEROL: 151 MG/DL (ref 0–130)
POTASSIUM SERPL-SCNC: 4.4 MMOL/L (ref 3.7–5.3)
SEX HORMONE BINDING GLOBULIN: 23 NMOL/L (ref 11–80)
SODIUM BLD-SCNC: 139 MMOL/L (ref 135–144)
T3 FREE: 2.93 PG/ML (ref 2.02–4.43)
TESTOSTERONE FREE-NONMALE: 68.6 PG/ML (ref 47–244)
TESTOSTERONE TOTAL: 288 NG/DL (ref 220–1000)
THYROXINE, FREE: 1.08 NG/DL (ref 0.93–1.7)
TOTAL CK: 57 U/L (ref 39–308)
TRIGL SERPL-MCNC: 136 MG/DL
TSH SERPL DL<=0.05 MIU/L-ACNC: 1.34 MIU/L (ref 0.3–5)
VLDLC SERPL CALC-MCNC: ABNORMAL MG/DL (ref 1–30)

## 2019-02-02 PROCEDURE — 80048 BASIC METABOLIC PNL TOTAL CA: CPT

## 2019-02-02 PROCEDURE — 84460 ALANINE AMINO (ALT) (SGPT): CPT

## 2019-02-02 PROCEDURE — 83036 HEMOGLOBIN GLYCOSYLATED A1C: CPT

## 2019-02-02 PROCEDURE — 84270 ASSAY OF SEX HORMONE GLOBUL: CPT

## 2019-02-02 PROCEDURE — 84439 ASSAY OF FREE THYROXINE: CPT

## 2019-02-02 PROCEDURE — 80061 LIPID PANEL: CPT

## 2019-02-02 PROCEDURE — 82565 ASSAY OF CREATININE: CPT

## 2019-02-02 PROCEDURE — 84443 ASSAY THYROID STIM HORMONE: CPT

## 2019-02-02 PROCEDURE — 84481 FREE ASSAY (FT-3): CPT

## 2019-02-02 PROCEDURE — 36415 COLL VENOUS BLD VENIPUNCTURE: CPT

## 2019-02-02 PROCEDURE — 82550 ASSAY OF CK (CPK): CPT

## 2019-02-02 PROCEDURE — 84450 TRANSFERASE (AST) (SGOT): CPT

## 2019-02-02 PROCEDURE — 84403 ASSAY OF TOTAL TESTOSTERONE: CPT

## 2019-02-04 ENCOUNTER — OFFICE VISIT (OUTPATIENT)
Dept: INTERNAL MEDICINE | Age: 44
End: 2019-02-04
Payer: COMMERCIAL

## 2019-02-04 VITALS
HEIGHT: 72 IN | DIASTOLIC BLOOD PRESSURE: 92 MMHG | WEIGHT: 273 LBS | HEART RATE: 74 BPM | BODY MASS INDEX: 36.98 KG/M2 | RESPIRATION RATE: 20 BRPM | SYSTOLIC BLOOD PRESSURE: 144 MMHG

## 2019-02-04 DIAGNOSIS — E29.1 HYPOGONADISM MALE: ICD-10-CM

## 2019-02-04 DIAGNOSIS — R73.01 IMPAIRED FASTING GLUCOSE: ICD-10-CM

## 2019-02-04 DIAGNOSIS — E04.1 THYROID NODULE: ICD-10-CM

## 2019-02-04 DIAGNOSIS — E78.2 MIXED HYPERLIPIDEMIA: ICD-10-CM

## 2019-02-04 DIAGNOSIS — E66.9 OBESITY (BMI 30-39.9): ICD-10-CM

## 2019-02-04 DIAGNOSIS — Z98.84 STATUS POST BARIATRIC SURGERY: Primary | ICD-10-CM

## 2019-02-04 DIAGNOSIS — R03.0 ELEVATED BP WITHOUT DIAGNOSIS OF HYPERTENSION: ICD-10-CM

## 2019-02-04 DIAGNOSIS — E55.9 VITAMIN D DEFICIENCY: ICD-10-CM

## 2019-02-04 DIAGNOSIS — F32.9 MAJOR DEPRESSION, CHRONIC: ICD-10-CM

## 2019-02-04 DIAGNOSIS — R51.9 NONINTRACTABLE HEADACHE, UNSPECIFIED CHRONICITY PATTERN, UNSPECIFIED HEADACHE TYPE: ICD-10-CM

## 2019-02-04 PROCEDURE — 99214 OFFICE O/P EST MOD 30 MIN: CPT | Performed by: INTERNAL MEDICINE

## 2019-02-04 RX ORDER — ACYCLOVIR 400 MG/1
400 TABLET ORAL 2 TIMES DAILY
COMMUNITY
End: 2019-10-15 | Stop reason: SDUPTHER

## 2019-02-04 ASSESSMENT — PATIENT HEALTH QUESTIONNAIRE - PHQ9
1. LITTLE INTEREST OR PLEASURE IN DOING THINGS: 0
SUM OF ALL RESPONSES TO PHQ9 QUESTIONS 1 & 2: 0
SUM OF ALL RESPONSES TO PHQ QUESTIONS 1-9: 0
2. FEELING DOWN, DEPRESSED OR HOPELESS: 0
SUM OF ALL RESPONSES TO PHQ QUESTIONS 1-9: 0

## 2019-02-05 ENCOUNTER — TELEPHONE (OUTPATIENT)
Dept: PEDIATRICS | Age: 44
End: 2019-02-05

## 2019-03-26 RX ORDER — ACYCLOVIR 400 MG/1
TABLET ORAL
Qty: 60 TABLET | Refills: 5 | Status: SHIPPED | OUTPATIENT
Start: 2019-03-26 | End: 2019-09-24 | Stop reason: SDUPTHER

## 2019-05-23 RX ORDER — DULOXETIN HYDROCHLORIDE 30 MG/1
CAPSULE, DELAYED RELEASE ORAL
Qty: 180 CAPSULE | Refills: 3 | Status: SHIPPED | OUTPATIENT
Start: 2019-05-23 | End: 2020-04-13 | Stop reason: SDUPTHER

## 2019-09-24 RX ORDER — ACYCLOVIR 400 MG/1
TABLET ORAL
Qty: 60 TABLET | Refills: 5 | Status: SHIPPED | OUTPATIENT
Start: 2019-09-24 | End: 2021-02-09

## 2019-10-15 ENCOUNTER — OFFICE VISIT (OUTPATIENT)
Dept: INTERNAL MEDICINE | Age: 44
End: 2019-10-15
Payer: COMMERCIAL

## 2019-10-15 VITALS
HEART RATE: 78 BPM | HEIGHT: 72 IN | BODY MASS INDEX: 36.57 KG/M2 | RESPIRATION RATE: 16 BRPM | WEIGHT: 270 LBS | DIASTOLIC BLOOD PRESSURE: 84 MMHG | SYSTOLIC BLOOD PRESSURE: 140 MMHG

## 2019-10-15 DIAGNOSIS — E66.9 OBESITY (BMI 30-39.9): Primary | ICD-10-CM

## 2019-10-15 DIAGNOSIS — F32.9 MAJOR DEPRESSION, CHRONIC: ICD-10-CM

## 2019-10-15 DIAGNOSIS — E78.2 MIXED HYPERLIPIDEMIA: ICD-10-CM

## 2019-10-15 DIAGNOSIS — E55.9 VITAMIN D DEFICIENCY: ICD-10-CM

## 2019-10-15 DIAGNOSIS — E04.1 THYROID NODULE: ICD-10-CM

## 2019-10-15 PROCEDURE — 99214 OFFICE O/P EST MOD 30 MIN: CPT | Performed by: INTERNAL MEDICINE

## 2019-10-15 RX ORDER — LISINOPRIL 10 MG/1
10 TABLET ORAL DAILY
Qty: 30 TABLET | Refills: 5 | Status: SHIPPED | OUTPATIENT
Start: 2019-10-15 | End: 2020-02-04

## 2020-01-25 ENCOUNTER — HOSPITAL ENCOUNTER (OUTPATIENT)
Dept: LAB | Age: 45
Discharge: HOME | End: 2020-01-25
Payer: COMMERCIAL

## 2020-01-25 LAB
ABSOLUTE EOS #: 0.07 K/UL (ref 0–0.44)
ABSOLUTE IMMATURE GRANULOCYTE: <0.03 K/UL (ref 0–0.3)
ABSOLUTE LYMPH #: 1.52 K/UL (ref 1.1–3.7)
ABSOLUTE MONO #: 0.3 K/UL (ref 0.1–1.2)
ALBUMIN SERPL-MCNC: 4.9 G/DL (ref 3.5–5.2)
ALBUMIN/GLOBULIN RATIO: 1.7 (ref 1–2.5)
ALP BLD-CCNC: 72 U/L (ref 40–129)
ALT SERPL-CCNC: 30 U/L (ref 5–41)
ANION GAP SERPL CALCULATED.3IONS-SCNC: 13 MMOL/L (ref 9–17)
AST SERPL-CCNC: 25 U/L
BASOPHILS # BLD: 1 % (ref 0–2)
BASOPHILS ABSOLUTE: <0.03 K/UL (ref 0–0.2)
BILIRUB SERPL-MCNC: 0.81 MG/DL (ref 0.3–1.2)
BUN BLDV-MCNC: 19 MG/DL (ref 6–20)
BUN/CREAT BLD: 25 (ref 9–20)
CALCIUM SERPL-MCNC: 9.8 MG/DL (ref 8.6–10.4)
CHLORIDE BLD-SCNC: 100 MMOL/L (ref 98–107)
CHOLESTEROL, FASTING: 219 MG/DL
CHOLESTEROL/HDL RATIO: 5.2
CO2: 26 MMOL/L (ref 20–31)
CREAT SERPL-MCNC: 0.77 MG/DL (ref 0.7–1.2)
DIFFERENTIAL TYPE: ABNORMAL
EOSINOPHILS RELATIVE PERCENT: 2 % (ref 1–4)
ESTIMATED AVERAGE GLUCOSE: 114 MG/DL
ESTIMATED AVERAGE GLUCOSE: 114 MG/DL
GFR AFRICAN AMERICAN: >60 ML/MIN
GFR NON-AFRICAN AMERICAN: >60 ML/MIN
GFR SERPL CREATININE-BSD FRML MDRD: ABNORMAL ML/MIN/{1.73_M2}
GFR SERPL CREATININE-BSD FRML MDRD: ABNORMAL ML/MIN/{1.73_M2}
GLUCOSE BLD-MCNC: 123 MG/DL (ref 70–99)
HBA1C MFR BLD: 5.6 % (ref 4.8–5.9)
HBA1C MFR BLD: 5.6 % (ref 4.8–5.9)
HCT VFR BLD CALC: 45.6 % (ref 40.7–50.3)
HDLC SERPL-MCNC: 42 MG/DL
HEMOGLOBIN: 15.8 G/DL (ref 13–17)
IMMATURE GRANULOCYTES: 0 %
LDL CHOLESTEROL: 152 MG/DL (ref 0–130)
LYMPHOCYTES # BLD: 38 % (ref 24–43)
MCH RBC QN AUTO: 31.5 PG (ref 25.2–33.5)
MCHC RBC AUTO-ENTMCNC: 34.6 G/DL (ref 25.2–33.5)
MCV RBC AUTO: 90.8 FL (ref 82.6–102.9)
MONOCYTES # BLD: 8 % (ref 3–12)
NRBC AUTOMATED: 0 PER 100 WBC
PDW BLD-RTO: 13.1 % (ref 11.8–14.4)
PLATELET # BLD: 206 K/UL (ref 138–453)
PLATELET ESTIMATE: ABNORMAL
PMV BLD AUTO: 10.1 FL (ref 8.1–13.5)
POTASSIUM SERPL-SCNC: 4.6 MMOL/L (ref 3.7–5.3)
RBC # BLD: 5.02 M/UL (ref 4.21–5.77)
RBC # BLD: ABNORMAL 10*6/UL
SEG NEUTROPHILS: 52 % (ref 36–65)
SEGMENTED NEUTROPHILS ABSOLUTE COUNT: 2.08 K/UL (ref 1.5–8.1)
SODIUM BLD-SCNC: 139 MMOL/L (ref 135–144)
T3 FREE: 3.32 PG/ML (ref 2.02–4.43)
TESTOSTERONE TOTAL: 298 NG/DL (ref 220–1000)
THYROXINE, FREE: 1.17 NG/DL (ref 0.93–1.7)
TOTAL PROTEIN: 7.8 G/DL (ref 6.4–8.3)
TRIGLYCERIDE, FASTING: 127 MG/DL
TSH SERPL DL<=0.05 MIU/L-ACNC: 0.86 MIU/L (ref 0.3–5)
TSH SERPL DL<=0.05 MIU/L-ACNC: 0.86 MIU/L (ref 0.3–5)
VITAMIN B-12: >2000 PG/ML (ref 232–1245)
VITAMIN D 25-HYDROXY: 52.5 NG/ML (ref 30–100)
VLDLC SERPL CALC-MCNC: ABNORMAL MG/DL (ref 1–30)
WBC # BLD: 4 K/UL (ref 3.5–11.3)
WBC # BLD: ABNORMAL 10*3/UL

## 2020-01-25 PROCEDURE — 84439 ASSAY OF FREE THYROXINE: CPT

## 2020-01-25 PROCEDURE — 36415 COLL VENOUS BLD VENIPUNCTURE: CPT

## 2020-01-25 PROCEDURE — 84443 ASSAY THYROID STIM HORMONE: CPT

## 2020-01-25 PROCEDURE — 82306 VITAMIN D 25 HYDROXY: CPT

## 2020-01-25 PROCEDURE — 85025 COMPLETE CBC W/AUTO DIFF WBC: CPT

## 2020-01-25 PROCEDURE — 80061 LIPID PANEL: CPT

## 2020-01-25 PROCEDURE — 84590 ASSAY OF VITAMIN A: CPT

## 2020-01-25 PROCEDURE — 84481 FREE ASSAY (FT-3): CPT

## 2020-01-25 PROCEDURE — 83036 HEMOGLOBIN GLYCOSYLATED A1C: CPT

## 2020-01-25 PROCEDURE — 80053 COMPREHEN METABOLIC PANEL: CPT

## 2020-01-25 PROCEDURE — 82607 VITAMIN B-12: CPT

## 2020-01-25 PROCEDURE — 84403 ASSAY OF TOTAL TESTOSTERONE: CPT

## 2020-01-28 LAB
RETINYL PALMITATE: 0.03 MG/L (ref 0–0.1)
VITAMIN A LEVEL: 0.51 MG/L (ref 0.3–1.2)
VITAMIN A, INTERP: NORMAL

## 2020-02-04 ENCOUNTER — OFFICE VISIT (OUTPATIENT)
Dept: INTERNAL MEDICINE | Age: 45
End: 2020-02-04
Payer: COMMERCIAL

## 2020-02-04 VITALS
TEMPERATURE: 98.6 F | DIASTOLIC BLOOD PRESSURE: 90 MMHG | SYSTOLIC BLOOD PRESSURE: 135 MMHG | BODY MASS INDEX: 36.57 KG/M2 | WEIGHT: 270 LBS | RESPIRATION RATE: 16 BRPM | HEART RATE: 68 BPM | HEIGHT: 72 IN

## 2020-02-04 PROCEDURE — 99214 OFFICE O/P EST MOD 30 MIN: CPT | Performed by: INTERNAL MEDICINE

## 2020-02-04 RX ORDER — LISINOPRIL 20 MG/1
20 TABLET ORAL DAILY
Qty: 30 TABLET | Refills: 5 | Status: SHIPPED | OUTPATIENT
Start: 2020-02-04 | End: 2020-08-05 | Stop reason: SDUPTHER

## 2020-02-05 NOTE — PROGRESS NOTES
numbness        Medications    Current Outpatient Medications:     lisinopril (PRINIVIL;ZESTRIL) 20 MG tablet, Take 1 tablet by mouth daily, Disp: 30 tablet, Rfl: 5    acyclovir (ZOVIRAX) 400 MG tablet, TAKE 1 TABLET BY MOUTH 2 TIMES A DAY, Disp: 60 tablet, Rfl: 5    DULoxetine (CYMBALTA) 30 MG extended release capsule, TAKE 2 CAPSULES BY MOUTH ONE TIME DAILY, Disp: 180 capsule, Rfl: 3    betamethasone dipropionate 0.05 % lotion, APPLY TO AFFECTED AREA(S) TWO TIMES A DAY AS NEEDED, Disp: 30 mL, Rfl: 0    betamethasone valerate (VALISONE) 0.1 % cream, APPLY TOPICALLY TWO TIMES A DAY AS NEEDED, Disp: 30 g, Rfl: 0    Multiple Vitamin (MVI, CELEBRATE, CHEWABLE TABLET), Take 1 tablet by mouth 2 times daily. , Disp: , Rfl:     CALCIUM CITRATE, Take 1,500 mg by mouth daily Indications: bariatric adv , Disp: , Rfl:     The patient is allergic to effexor [venlafaxine]; shellfish-derived products; and morphine. Past Medical History  Nohemy Sarkar  has a past medical history of Depression, Gastric bypass status for obesity, GERD (gastroesophageal reflux disease), Hyperlipidemia, Hypertension, Hypogonadism male, Impaired fasting glucose, Obesity, Obstructive sleep apnea, Psoriasis, Sensorineural hearing loss, Thyroid nodule, and Vitamin D deficiency. Past Surgical History  The patient  has a past surgical history that includes knee surgery (1994, 2009); [place] tooth extraction (1997); Upper gastrointestinal endoscopy (2008); and Jose Alfredo-en-Y Gastric Bypass (09/18/2013).     Family History  This patient's family history includes Arthritis in his maternal grandmother and mother; Asthma in his brother; Cancer in his sister; Depression in his mother; Diabetes in his maternal aunt and mother; Glaucoma in an other family member; Hearing Loss in his paternal grandfather; Heart Disease in his maternal grandfather and mother; High Blood Pressure in his maternal grandfather, maternal uncle, and mother; High Cholesterol in his maternal aunt, maternal uncle, and mother; Other in his mother; Thyroid Disease in his mother. Social History  Sabrina Parnell  reports that he has never smoked. He has never used smokeless tobacco. He reports that he does not drink alcohol or use drugs. Health Maintenance:    Health Maintenance   Topic Date Due    A1C test (Diabetic or Prediabetic)  01/25/2021    Potassium monitoring  01/25/2021    Creatinine monitoring  01/25/2021    Shingles Vaccine (1 of 2) 06/02/2023    Lipid screen  01/25/2025    DTaP/Tdap/Td vaccine (3 - Td) 06/28/2026    Flu vaccine  Completed    HIV screen  Addressed    Hepatitis A vaccine  Aged Out    Hepatitis B vaccine  Aged Out    Hib vaccine  Aged Out    Meningococcal (ACWY) vaccine  Aged Out    Pneumococcal 0-64 years Vaccine  Aged Out           Objective:     PHYSICAL EXAM  BP (!) 135/90   Pulse 68   Temp 98.6 °F (37 °C) (Tympanic)   Resp 16   Ht 5' 11.65\" (1.82 m)   Wt 270 lb (122.5 kg)   BMI 36.97 kg/m²   Constitutional: No acute distress. Sits in chair comfortably  Eyes: Sclerae nonicteric. No lid lag or proptosis  HENT: External ears normal. No external lesions on the nose  Neck: No gross masses. Trachea visibly midline  Respiratory: Good air entry bilaterally. No wheezing or crackles  Cardiovascular: Normal S1-S2. No murmurs. No lower extremity edema  Gastrointestinal: No visible masses. No visible hernias  Skin: No abnormal rashes. No abnormal masses  Neurologic: Cranial nerves grossly intact  Psychiatric: Normal affect.  Alert and oriented        Labs Reviewed 2/4/2020:    Lab Results   Component Value Date    WBC 4.0 01/25/2020    HGB 15.8 01/25/2020    HCT 45.6 01/25/2020     01/25/2020    CHOL 222 (H) 02/02/2019    TRIG 136 02/02/2019    HDL 42 01/25/2020    ALT 30 01/25/2020    AST 25 01/25/2020     01/25/2020    K 4.6 01/25/2020     01/25/2020    CREATININE 0.77 01/25/2020    BUN 19 01/25/2020    CO2 26 01/25/2020    TSH 0.86 01/25/2020    PSA 0.32 12/20/2013    LABA1C 5.6 01/25/2020       Plan        Hyperlipidemia: We will continue to monitor lipid panel. Does not want to start statin at this time. Vitamin D deficiency: We will continue to monitor. Vitamin B12 deficiency: Vitamin B12 levels over normal.  Reassess next visit. Low back pain: Likely musculoskeletal. We will monitor. Diagnosis Orders   1. Vitamin D deficiency  Vitamin D 25 Hydroxy    Vitamin B12   2. Mixed hyperlipidemia  CBC Auto Differential    Comprehensive Metabolic Panel    Lipid Panel    Vitamin D 25 Hydroxy   3. Obesity (BMI 30-39. 9)  Hemoglobin A1C   4. Screening for prostate cancer  PSA, Diagnostic         No follow-ups on file. Patient given educational materials - see patient instructions. Discussed use, benefit, and side effects of prescribed medications. All patient questions answered. Pt voiced understanding. Reviewed health maintenance. Electronically signed Zach Morales MD on 2/4/2020 at 9:02 AM      This note has been created using the Epic electronic health record, and dictated in part by [employer] dictation system. Despite the documenting physician's best efforts, there may be errors in spelling, grammar or syntax.

## 2020-02-17 ENCOUNTER — OFFICE VISIT (OUTPATIENT)
Dept: OPHTHALMOLOGY | Age: 45
End: 2020-02-17
Payer: COMMERCIAL

## 2020-02-17 PROCEDURE — 99213 OFFICE O/P EST LOW 20 MIN: CPT | Performed by: OPHTHALMOLOGY

## 2020-02-17 RX ORDER — VALACYCLOVIR HYDROCHLORIDE 1 G/1
1000 TABLET, FILM COATED ORAL 3 TIMES DAILY
Qty: 45 TABLET | Refills: 3 | Status: SHIPPED | OUTPATIENT
Start: 2020-02-17 | End: 2020-02-24

## 2020-02-17 ASSESSMENT — REFRACTION_WEARINGRX
OS_SPHERE: -6.00
OD_CYLINDER: -0.50
OD_SPHERE: -5.75
SPECS_TYPE: SVL
OS_CYLINDER: -0.50
OS_AXIS: 095
OD_AXIS: 048

## 2020-02-17 ASSESSMENT — ENCOUNTER SYMPTOMS
GASTROINTESTINAL NEGATIVE: 0
EYES NEGATIVE: 1

## 2020-02-17 ASSESSMENT — CONF VISUAL FIELD
METHOD: COUNTING FINGERS
OD_NORMAL: 1
OS_NORMAL: 1

## 2020-02-17 ASSESSMENT — SLIT LAMP EXAM - LIDS
COMMENTS: 1+ MEIBOMIAN GLAND DYSFUNCTION
COMMENTS: 1+ MEIBOMIAN GLAND DYSFUNCTION

## 2020-02-17 ASSESSMENT — TONOMETRY
OS_IOP_MMHG: 16
OD_IOP_MMHG: 12
IOP_METHOD: NON-CONTACT AIR PUFF

## 2020-02-17 ASSESSMENT — VISUAL ACUITY
OS_CC: 20/20
CORRECTION_TYPE: GLASSES
METHOD: SNELLEN - LINEAR

## 2020-02-17 NOTE — PROGRESS NOTES
Medical History:   Diagnosis Date    Depression     /obsessive-compulsive disorder, Dr. Cindy Piper.  Gastric bypass status for obesity 9/6/13    preop wt 338.2lb    GERD (gastroesophageal reflux disease)     1) EGD 06/08 with minimal esophagitis, not confirmed with biopsy.  Hyperlipidemia     2% risk over 10 years on calculator December 2014    Hypertension     Hypogonadism male     1) Erectile dysfunction. 2) Gynecomastia. 3) Dr. Shannan Hankins, endocrinology, [facility] evaluation 07/08. 4) MRI of head 08/06.  Impaired fasting glucose     Obesity     BMI 45, 06/08., Bariatric surg 9/13    Obstructive sleep apnea     11/08, CPAP 10.- noncompliant 2016    Psoriasis     Treated with topical steroids Dr Rao Channel   Betamethasone cream and lotion    Sensorineural hearing loss     1) Dr. Chelsie Joe evaluation, 08/06. MRI of head negative 08/06.  Thyroid nodule     right sided, fine needle aspiration 01/09 by Dr. Shannan Hankins negative. 1) Repeat ultrasound 06/10 stable  Dr. Shannan Hankins. 2) Fine needle aspiration repeat 10/11, negative.  Vitamin D deficiency     (Dr. Shannan Hankins). Current Outpatient Medications:     valACYclovir (VALTREX) 1 g tablet, Take 1 tablet by mouth 3 times daily for 7 days, Disp: 45 tablet, Rfl: 3    lisinopril (PRINIVIL;ZESTRIL) 20 MG tablet, Take 1 tablet by mouth daily, Disp: 30 tablet, Rfl: 5    acyclovir (ZOVIRAX) 400 MG tablet, TAKE 1 TABLET BY MOUTH 2 TIMES A DAY, Disp: 60 tablet, Rfl: 5    DULoxetine (CYMBALTA) 30 MG extended release capsule, TAKE 2 CAPSULES BY MOUTH ONE TIME DAILY, Disp: 180 capsule, Rfl: 3    betamethasone dipropionate 0.05 % lotion, APPLY TO AFFECTED AREA(S) TWO TIMES A DAY AS NEEDED, Disp: 30 mL, Rfl: 0    betamethasone valerate (VALISONE) 0.1 % cream, APPLY TOPICALLY TWO TIMES A DAY AS NEEDED, Disp: 30 g, Rfl: 0    Multiple Vitamin (MVI, CELEBRATE, CHEWABLE TABLET), Take 1 tablet by mouth 2 times daily. , Disp: , Rfl:     CALCIUM CITRATE,

## 2020-04-13 RX ORDER — DULOXETIN HYDROCHLORIDE 30 MG/1
CAPSULE, DELAYED RELEASE ORAL
Qty: 180 CAPSULE | Refills: 3 | Status: SHIPPED
Start: 2020-04-13 | End: 2021-02-08 | Stop reason: DRUGHIGH

## 2020-04-24 NOTE — TELEPHONE ENCOUNTER
From: Delisa Ta  To: Jose Batista MD  Sent: 2/12/2018 1:10 PM EST  Subject: Prescription Question    I would like to get a refill on Valtrex 1gm for cold sores. If possible could I have a script for #32 with directions of 2 tablets every 12 hours for 2 doses. That prescription can be sent to [facility]. If you have any questions please contact my wife Rich Galo) in the clinic pharmacy. Thank you.
(2) well flexed

## 2020-07-31 ENCOUNTER — HOSPITAL ENCOUNTER (OUTPATIENT)
Dept: LAB | Age: 45
Discharge: HOME | End: 2020-07-31
Payer: COMMERCIAL

## 2020-07-31 LAB
ABSOLUTE EOS #: 0.1 K/UL (ref 0–0.44)
ABSOLUTE IMMATURE GRANULOCYTE: <0.03 K/UL (ref 0–0.3)
ABSOLUTE LYMPH #: 1.82 K/UL (ref 1.1–3.7)
ABSOLUTE MONO #: 0.38 K/UL (ref 0.1–1.2)
ALBUMIN SERPL-MCNC: 4.5 G/DL (ref 3.5–5.2)
ALBUMIN/GLOBULIN RATIO: 1.7 (ref 1–2.5)
ALP BLD-CCNC: 81 U/L (ref 40–129)
ALT SERPL-CCNC: 31 U/L (ref 5–41)
ANION GAP SERPL CALCULATED.3IONS-SCNC: 11 MMOL/L (ref 9–17)
AST SERPL-CCNC: 23 U/L
BASOPHILS # BLD: 0 % (ref 0–2)
BASOPHILS ABSOLUTE: <0.03 K/UL (ref 0–0.2)
BILIRUB SERPL-MCNC: 0.91 MG/DL (ref 0.3–1.2)
BUN BLDV-MCNC: 12 MG/DL (ref 6–20)
BUN/CREAT BLD: 13 (ref 9–20)
CALCIUM SERPL-MCNC: 9.7 MG/DL (ref 8.6–10.4)
CHLORIDE BLD-SCNC: 101 MMOL/L (ref 98–107)
CHOLESTEROL/HDL RATIO: 5.7
CHOLESTEROL: 235 MG/DL
CO2: 28 MMOL/L (ref 20–31)
CREAT SERPL-MCNC: 0.96 MG/DL (ref 0.7–1.2)
DIFFERENTIAL TYPE: ABNORMAL
EOSINOPHILS RELATIVE PERCENT: 2 % (ref 1–4)
ESTIMATED AVERAGE GLUCOSE: 105 MG/DL
GFR AFRICAN AMERICAN: >60 ML/MIN
GFR NON-AFRICAN AMERICAN: >60 ML/MIN
GFR SERPL CREATININE-BSD FRML MDRD: ABNORMAL ML/MIN/{1.73_M2}
GFR SERPL CREATININE-BSD FRML MDRD: ABNORMAL ML/MIN/{1.73_M2}
GLUCOSE BLD-MCNC: 121 MG/DL (ref 70–99)
HBA1C MFR BLD: 5.3 % (ref 4.8–5.9)
HCT VFR BLD CALC: 42.3 % (ref 40.7–50.3)
HDLC SERPL-MCNC: 41 MG/DL
HEMOGLOBIN: 14.8 G/DL (ref 13–17)
IMMATURE GRANULOCYTES: 0 %
LDL CHOLESTEROL: 167 MG/DL (ref 0–130)
LYMPHOCYTES # BLD: 39 % (ref 24–43)
MCH RBC QN AUTO: 32.6 PG (ref 25.2–33.5)
MCHC RBC AUTO-ENTMCNC: 35 G/DL (ref 25.2–33.5)
MCV RBC AUTO: 93.2 FL (ref 82.6–102.9)
MONOCYTES # BLD: 8 % (ref 3–12)
NRBC AUTOMATED: 0 PER 100 WBC
PDW BLD-RTO: 12.4 % (ref 11.8–14.4)
PLATELET # BLD: 192 K/UL (ref 138–453)
PLATELET ESTIMATE: ABNORMAL
PMV BLD AUTO: 10 FL (ref 8.1–13.5)
POTASSIUM SERPL-SCNC: 4.4 MMOL/L (ref 3.7–5.3)
PROSTATE SPECIFIC ANTIGEN: 0.42 UG/L
RBC # BLD: 4.54 M/UL (ref 4.21–5.77)
RBC # BLD: ABNORMAL 10*6/UL
SEG NEUTROPHILS: 51 % (ref 36–65)
SEGMENTED NEUTROPHILS ABSOLUTE COUNT: 2.31 K/UL (ref 1.5–8.1)
SODIUM BLD-SCNC: 140 MMOL/L (ref 135–144)
TOTAL PROTEIN: 7.1 G/DL (ref 6.4–8.3)
TRIGL SERPL-MCNC: 135 MG/DL
VITAMIN B-12: 1099 PG/ML (ref 232–1245)
VITAMIN D 25-HYDROXY: 60 NG/ML (ref 30–100)
VLDLC SERPL CALC-MCNC: ABNORMAL MG/DL (ref 1–30)
WBC # BLD: 4.6 K/UL (ref 3.5–11.3)
WBC # BLD: ABNORMAL 10*3/UL

## 2020-07-31 PROCEDURE — G0103 PSA SCREENING: HCPCS

## 2020-07-31 PROCEDURE — 82607 VITAMIN B-12: CPT

## 2020-07-31 PROCEDURE — 80061 LIPID PANEL: CPT

## 2020-07-31 PROCEDURE — 82306 VITAMIN D 25 HYDROXY: CPT

## 2020-07-31 PROCEDURE — 85025 COMPLETE CBC W/AUTO DIFF WBC: CPT

## 2020-07-31 PROCEDURE — 36415 COLL VENOUS BLD VENIPUNCTURE: CPT

## 2020-07-31 PROCEDURE — 80053 COMPREHEN METABOLIC PANEL: CPT

## 2020-07-31 PROCEDURE — 83036 HEMOGLOBIN GLYCOSYLATED A1C: CPT

## 2020-08-05 ENCOUNTER — OFFICE VISIT (OUTPATIENT)
Dept: INTERNAL MEDICINE | Age: 45
End: 2020-08-05
Payer: COMMERCIAL

## 2020-08-05 VITALS
SYSTOLIC BLOOD PRESSURE: 108 MMHG | BODY MASS INDEX: 36.14 KG/M2 | DIASTOLIC BLOOD PRESSURE: 64 MMHG | TEMPERATURE: 97.1 F | WEIGHT: 266.8 LBS | RESPIRATION RATE: 16 BRPM | HEART RATE: 66 BPM | HEIGHT: 72 IN

## 2020-08-05 PROCEDURE — 99214 OFFICE O/P EST MOD 30 MIN: CPT | Performed by: INTERNAL MEDICINE

## 2020-08-05 RX ORDER — LISINOPRIL 10 MG/1
10 TABLET ORAL DAILY
Qty: 30 TABLET | Refills: 5 | Status: SHIPPED | OUTPATIENT
Start: 2020-08-05 | End: 2020-12-17

## 2020-08-05 NOTE — PROGRESS NOTES
[place] [address]  Dept: [phone]  Dept Fax: [phone]  Loc: [phone]     Visit Date:  8/5/2020    Patient:  Jorge Bhardwaj  YOB: 1975  Provider: Celia Go MD        HPI:   He was seen in the internal medicine office today for   Chief Complaint   Patient presents with    Blood Sugar Problem    Hyperlipidemia    Hypertension    Ear Fullness     feels like pressure build up, ringing,           Complains of a pressure buildup in both his ears, especially in the right ear that has been going on for the last several weeks. Denies earache, headache, fever, chills, tinnitus. Moreover, he complains of feeling lightheaded especially as soon as he stands up. Says that he takes lisinopril for blood pressure every day. Says that he does not drink a lot of fluids on a daily basis. Has a history of hypertension which has been stable. Blood pressure on the lower side today. Has a history of hyperlipidemia which has been getting worse. However ASCVD score is not bad enough to warrant treatment with statin.       Subjective:      REVIEW OF SYSTEMS    Constitutional: Denies fever, chills  Eyes: Denies recent vision changes  Cardiovascular: Denies chest pain, LL edema  Respiratory: Denies cough, wheezes  Gastrointestinal: Denies abdominal pain, nausea, vomiting  Skin: Denies rash  Endocrine: Denies polyuria  Hematologic: Denies bruising  Genitourinary: Denies dysuria, hematuria  Neurological: Denies headache, numbness        Medications    Current Outpatient Medications:     lisinopril (PRINIVIL;ZESTRIL) 10 MG tablet, Take 1 tablet by mouth daily, Disp: 30 tablet, Rfl: 5    DULoxetine (CYMBALTA) 30 MG extended release capsule, TAKE 2 CAPSULES BY MOUTH ONE TIME DAILY, Disp: 180 capsule, Rfl: 3    acyclovir (ZOVIRAX) 400 MG tablet, TAKE 1 TABLET BY MOUTH 2 TIMES A DAY, Disp: 60 tablet, Rfl: 5    betamethasone dipropionate 0.05 % lotion, APPLY TO AFFECTED AREA(S) TWO TIMES A DAY AS NEEDED, Disp: 30 mL, Rfl: 0    betamethasone valerate (VALISONE) 0.1 % cream, APPLY TOPICALLY TWO TIMES A DAY AS NEEDED, Disp: 30 g, Rfl: 0    Multiple Vitamin (MVI, CELEBRATE, CHEWABLE TABLET), Take 1 tablet by mouth 2 times daily. , Disp: , Rfl:     CALCIUM CITRATE, Take 1,500 mg by mouth daily Indications: bariatric adv , Disp: , Rfl:     The patient is allergic to effexor [venlafaxine]; shellfish-derived products; and morphine. Past Medical History  Suzanne Nelson  has a past medical history of Depression, Gastric bypass status for obesity, GERD (gastroesophageal reflux disease), Hyperlipidemia, Hypertension, Hypogonadism male, Impaired fasting glucose, Obesity, Obstructive sleep apnea, Psoriasis, Sensorineural hearing loss, Thyroid nodule, and Vitamin D deficiency. Past Surgical History  The patient  has a past surgical history that includes knee surgery (1994, 2009); [place] tooth extraction (1997); Upper gastrointestinal endoscopy (2008); and Jose Alfredo-en-Y Gastric Bypass (09/18/2013). Family History  This patient's family history includes Arthritis in his maternal grandmother and mother; Asthma in his brother; Cancer in his sister; Depression in his mother; Diabetes in his maternal aunt and mother; Glaucoma in an other family member; Hearing Loss in his paternal grandfather; Heart Disease in his maternal grandfather and mother; High Blood Pressure in his maternal grandfather, maternal uncle, and mother; High Cholesterol in his maternal aunt, maternal uncle, and mother; Other in his mother; Thyroid Disease in his mother. Social History  Suzanne Nelson  reports that he has never smoked. He has never used smokeless tobacco. He reports that he does not drink alcohol or use drugs.     Health Maintenance:    Health Maintenance   Topic Date Due    Flu vaccine (1) 09/01/2020    A1C test (Diabetic or Prediabetic)  07/31/2021    Potassium monitoring  07/31/2021    Creatinine monitoring  07/31/2021    Lipid screen  07/31/2025    DTaP/Tdap/Td vaccine (3 - Td) 06/28/2026    HIV screen  Addressed    Hepatitis A vaccine  Aged Out    Hepatitis B vaccine  Aged Out    Hib vaccine  Aged Out    Meningococcal (ACWY) vaccine  Aged Out    Pneumococcal 0-64 years Vaccine  Aged Out           Objective:     PHYSICAL EXAM  /64 (Site: Left Upper Arm, Position: Sitting, Cuff Size: Large Adult)   Pulse 66   Temp 97.1 °F (36.2 °C) (Infrared)   Resp 16   Ht 5' 11.65\" (1.82 m)   Wt 266 lb 12.8 oz (121 kg)   BMI 36.54 kg/m²   Constitutional: No acute distress. Sits in chair comfortably  Eyes: Sclerae nonicteric. No lid lag or proptosis  HENT: Cerumen impaction both ears  Neck: No gross masses. Trachea visibly midline  Respiratory: Good air entry bilaterally. No wheezing or crackles  Cardiovascular: Normal S1-S2. No murmurs. No lower extremity edema  Gastrointestinal: No visible masses. No visible hernias  Skin: No abnormal rashes. No abnormal masses  Neurologic: Cranial nerves grossly intact  Psychiatric: Normal affect. Alert and oriented        Labs Reviewed 8/5/2020:    Lab Results   Component Value Date    WBC 4.6 07/31/2020    HGB 14.8 07/31/2020    HCT 42.3 07/31/2020     07/31/2020    CHOL 235 (H) 07/31/2020    TRIG 135 07/31/2020    HDL 41 07/31/2020    ALT 31 07/31/2020    AST 23 07/31/2020     07/31/2020    K 4.4 07/31/2020     07/31/2020    CREATININE 0.96 07/31/2020    BUN 12 07/31/2020    CO2 28 07/31/2020    TSH 0.86 01/25/2020    PSA 0.42 07/31/2020    LABA1C 5.3 07/31/2020       Plan        Hyperlipidemia: We will continue to monitor lipid panel. Slightly worse than before. However ASCVD score is not high enough. Advised regarding diet and exercise. Hypertension: Will decrease lisinopril 20 mg to 10 due to dizziness. Blood pressure on the low normal side today.   Reassess next visit    Impacted cerumen in both ears: He does not want to flush it at this time. We can monitor. Advised that he can call the office if it gets worse. Diagnosis Orders   1. Mixed hyperlipidemia  CBC Auto Differential    Comprehensive Metabolic Panel    Lipid Panel   2. Obesity (BMI 30-39. 9)  Hemoglobin A1C   3. Hypertension, unspecified type           No follow-ups on file. Patient given educational materials - see patient instructions. Discussed use, benefit, and side effects of prescribed medications. All patient questions answered. Pt voiced understanding. Reviewed health maintenance. Electronically signed Fiona Burger MD on 8/5/2020 at 1:14 PM      This note has been created using the Epic electronic health record, and dictated in part by [id number] dictation system. Despite the documenting physician's best efforts, there may be errors in spelling, grammar or syntax.

## 2020-08-06 RX ORDER — VALACYCLOVIR HYDROCHLORIDE 1 G/1
1000 TABLET, FILM COATED ORAL 3 TIMES DAILY
Qty: 45 TABLET | Refills: 0 | Status: SHIPPED | OUTPATIENT
Start: 2020-08-06 | End: 2020-09-15

## 2020-09-02 ENCOUNTER — HOSPITAL ENCOUNTER (OUTPATIENT)
Age: 45
Setting detail: SPECIMEN
Discharge: HOME | End: 2020-09-02
Payer: COMMERCIAL

## 2020-09-02 ENCOUNTER — OFFICE VISIT (OUTPATIENT)
Dept: PRIMARY CARE CLINIC | Age: 45
End: 2020-09-02
Payer: COMMERCIAL

## 2020-09-02 VITALS
RESPIRATION RATE: 18 BRPM | SYSTOLIC BLOOD PRESSURE: 138 MMHG | HEIGHT: 72 IN | OXYGEN SATURATION: 98 % | WEIGHT: 270.8 LBS | HEART RATE: 100 BPM | DIASTOLIC BLOOD PRESSURE: 98 MMHG | BODY MASS INDEX: 36.68 KG/M2 | TEMPERATURE: 98.6 F

## 2020-09-02 PROCEDURE — 99213 OFFICE O/P EST LOW 20 MIN: CPT | Performed by: NURSE PRACTITIONER

## 2020-09-02 PROCEDURE — U0003 INFECTIOUS AGENT DETECTION BY NUCLEIC ACID (DNA OR RNA); SEVERE ACUTE RESPIRATORY SYNDROME CORONAVIRUS 2 (SARS-COV-2) (CORONAVIRUS DISEASE [COVID-19]), AMPLIFIED PROBE TECHNIQUE, MAKING USE OF HIGH THROUGHPUT TECHNOLOGIES AS DESCRIBED BY CMS-2020-01-R: HCPCS

## 2020-09-02 ASSESSMENT — ENCOUNTER SYMPTOMS
WHEEZING: 0
RHINORRHEA: 1
SHORTNESS OF BREATH: 0
COUGH: 1
NAUSEA: 0
PHOTOPHOBIA: 1
GASTROINTESTINAL NEGATIVE: 1
SORE THROAT: 0
ABDOMINAL PAIN: 0
SINUS PRESSURE: 1
VOMITING: 0

## 2020-09-02 ASSESSMENT — PATIENT HEALTH QUESTIONNAIRE - PHQ9
SUM OF ALL RESPONSES TO PHQ QUESTIONS 1-9: 0
SUM OF ALL RESPONSES TO PHQ9 QUESTIONS 1 & 2: 0
SUM OF ALL RESPONSES TO PHQ QUESTIONS 1-9: 0
1. LITTLE INTEREST OR PLEASURE IN DOING THINGS: 0
2. FEELING DOWN, DEPRESSED OR HOPELESS: 0

## 2020-09-02 NOTE — PATIENT INSTRUCTIONS
if you are sick or have been exposed to the virus. Don't go to school, work, or public areas. And don't use public transportation, ride-shares, or taxis unless you have no choice. · If you are sick:  ? Leave your home only if you need to get medical care. But call the doctor's office first so they know you're coming. And wear a face cover. ? Wear the face cover whenever you're around other people. It can help stop the spread of the virus when you cough or sneeze. ? Clean and disinfect your home every day. Use household  and disinfectant wipes or sprays. Take special care to clean things that you grab with your hands. These include doorknobs, remote controls, phones, and handles on your refrigerator and microwave. And don't forget countertops, tabletops, bathrooms, and computer keyboards. When to call for help  [id number] anytime you think you may need emergency care. For example, call if:  · You have severe trouble breathing. (You can't talk at all.)  · You have constant chest pain or pressure. · You are severely dizzy or lightheaded. · You are confused or can't think clearly. · Your face and lips have a blue color. · You pass out (lose consciousness) or are very hard to wake up. Call your doctor now if you develop symptoms such as:  · Shortness of breath. · Fever. · Cough. If you need to get care, call ahead to the doctor's office for instructions before you go. Make sure you wear a face cover to prevent exposing other people to the virus. Where can you get the latest information? The following health organizations are tracking and studying this virus. Their websites contain the most up-to-date information. Dheeraj Motta also learn what to do if you think you may have been exposed to the virus. · U.[place]. Centers for Disease Control and Prevention (CDC): The CDC provides updated news about the disease and travel advice. The website also tells you how to prevent the spread of infection.  www.cdc.gov  · World Health Organization [facility]): WHO offers information about the virus outbreaks. WHO also has travel advice. www.who.int  Current as of: May 8, 2020               Content Version: 12.5  © 2006-2020 Healthwise, Incorporated. Care instructions adapted under license by [facility] ([facility]). If you have questions about a medical condition or this instruction, always ask your healthcare professional. [address] any warranty or liability for your use of this information. Patient Education        Coronavirus ([id number]): Care Instructions  Overview  The coronavirus disease (COVID-19) is caused by a virus. Symptoms may include a fever, a cough, and shortness of breath. It mainly spreads person-to-person through droplets from coughing and sneezing. The virus also can spread when people are in close contact with someone who is infected. Most people have mild symptoms and can take care of themselves at home. If their symptoms get worse, they may need care in a hospital. There is no medicine to fight the virus. It's important to not spread the virus to others. If you have COVID-19, wear a face cover anytime you are around other people. You need to isolate yourself while you are sick. Your doctor or local public health official will tell you when you no longer need to be isolated. Leave your home only if you need to get medical care. Follow-up care is a key part of your treatment and safety. Be sure to make and go to all appointments, and call your doctor if you are having problems. It's also a good idea to know your test results and keep a list of the medicines you take. How can you care for yourself at home? · Get extra rest. It can help you feel better. · Drink plenty of fluids. This helps replace fluids lost from fever. Fluids also help ease a scratchy throat. Water, soup, fruit juice, and hot tea with lemon are good choices. · Take acetaminophen (such as Tylenol) to reduce a fever.  It may also help with muscle aches. Read and follow all instructions on the label. · Sponge your body with lukewarm water to help with fever. Don't use cold water or ice. · Use petroleum jelly on sore skin. This can help if the skin around your nose and lips becomes sore from rubbing a lot with tissues. Tips for isolation  · Wear a cloth face cover when you are around other people. It can help stop the spread of the virus when you cough or sneeze. · Limit contact with people in your home. If possible, stay in a separate bedroom and use a separate bathroom. · If you have to leave home, avoid crowds and try to stay at least 6 feet away from other people. · Avoid contact with pets and other animals. · Cover your mouth and nose with a tissue when you cough or sneeze. Then throw it in the trash right away. · Wash your hands often, especially after you cough or sneeze. Use soap and water, and scrub for at least 20 seconds. If soap and water aren't available, use an alcohol-based hand . · Don't share personal household items. These include bedding, towels, cups and glasses, and eating utensils. · [address] in the warmest water allowed for the fabric type, and dry it completely. It's okay to wash other people's laundry with yours. · Clean and disinfect your home every day. Use household  and disinfectant wipes or sprays. Take special care to clean things that you grab with your hands. These include doorknobs, remote controls, phones, and handles on your refrigerator and microwave. And don't forget countertops, tabletops, bathrooms, and computer keyboards. When should you call for help? [id number] anytime you think you may need emergency care. For example, call if you have life-threatening symptoms, such as:  · You have severe trouble breathing. (You can't talk at all.)  · You have constant chest pain or pressure. · You are severely dizzy or lightheaded. · You are confused or can't think clearly.   · Your face and lips have a blue color. · You pass out (lose consciousness) or are very hard to wake up. Call your doctor now or seek immediate medical care if:  · You have moderate trouble breathing. (You can't speak a full sentence.)  · You are coughing up blood (more than about 1 teaspoon). · You have signs of low blood pressure. These include feeling lightheaded; being too weak to stand; and having cold, pale, clammy skin. Watch closely for changes in your health, and be sure to contact your doctor if:  · Your symptoms get worse. · You are not getting better as expected. Call before you go to the doctor's office. Follow their instructions. And wear a cloth face cover. Current as of: May 8, 2020               Content Version: 12.5  © 8200-5101 Healthwise, Incorporated. Care instructions adapted under license by [facility] ([facility]). If you have questions about a medical condition or this instruction, always ask your healthcare professional. [place] [address] any warranty or liability for your use of this information. Patient Education        Cough: Care Instructions  Your Care Instructions     A cough is your body's response to something that bothers your throat or airways. Many things can cause a cough. You might cough because of a cold or the flu, bronchitis, or asthma. Smoking, postnasal drip, allergies, and stomach acid that backs up into your throat also can cause coughs. A cough is a symptom, not a disease. Most coughs stop when the cause, such as a cold, goes away. You can take a few steps at home to cough less and feel better. Follow-up care is a key part of your treatment and safety. Be sure to make and go to all appointments, and call your doctor if you are having problems. It's also a good idea to know your test results and keep a list of the medicines you take. How can you care for yourself at home? · Drink lots of water and other fluids.  This helps thin the mucus and soothes a dry or sore throat. Honey or lemon juice in hot water or tea may ease a dry cough. · Take cough medicine as directed by your doctor. · Prop up your head on pillows to help you breathe and ease a dry cough. · Try cough drops to soothe a dry or sore throat. Cough drops don't stop a cough. Medicine-flavored cough drops are no better than candy-flavored drops or hard candy. · Do not smoke. Avoid secondhand smoke. If you need help quitting, talk to your doctor about stop-smoking programs and medicines. These can increase your chances of quitting for good. When should you call for help? [id number] anytime you think you may need emergency care. For example, call if:  · You have severe trouble breathing. Call your doctor now or seek immediate medical care if:  · You cough up blood. · You have new or worse trouble breathing. · You have a new or higher fever. · You have a new rash. Watch closely for changes in your health, and be sure to contact your doctor if:  · You cough more deeply or more often, especially if you notice more mucus or a change in the color of your mucus. · You have new symptoms, such as a sore throat, an earache, or sinus pain. · You do not get better as expected. Where can you learn more? Go to https://[employer]. org and sign in to your [employer] account. Enter D279 in the [facility] box to learn more about \"Cough: Care Instructions. \"     If you do not have an account, please click on the \"Sign Up Now\" link. Current as of: February 24, 2020               Content Version: 12.5  © 5926-8441 Healthwise, Incorporated. Care instructions adapted under license by [facility] ([facility]). If you have questions about a medical condition or this instruction, always ask your healthcare professional. [place] [address] any warranty or liability for your use of this information.          Patient Education        Headache: Care Instructions  Your Care Instructions Headaches have many possible causes. Most headaches aren't a sign of a more serious problem, and they will get better on their own. Home treatment may help you feel better faster. The doctor has checked you carefully, but problems can develop later. If you notice any problems or new symptoms, get medical treatment right away. Follow-up care is a key part of your treatment and safety. Be sure to make and go to all appointments, and call your doctor if you are having problems. It's also a good idea to know your test results and keep a list of the medicines you take. How can you care for yourself at home? · Do not drive if you have taken a prescription pain medicine. · Rest in a quiet, dark room until your headache is gone. Close your eyes and try to relax or go to sleep. Don't watch TV or read. · Put a cold, moist cloth or cold pack on the painful area for 10 to 20 minutes at a time. Put a thin cloth between the cold pack and your skin. · Use a warm, moist towel or a heating pad set on low to relax tight shoulder and neck muscles. · Have someone gently massage your neck and shoulders. · Take pain medicines exactly as directed. ? If the doctor gave you a prescription medicine for pain, take it as prescribed. ? If you are not taking a prescription pain medicine, ask your doctor if you can take an over-the-counter medicine. · Be careful not to take pain medicine more often than the instructions allow, because you may get worse or more frequent headaches when the medicine wears off. · Do not ignore new symptoms that occur with a headache, such as a fever, weakness or numbness, vision changes, or confusion. These may be signs of a more serious problem. To prevent headaches  · Keep a headache diary so you can figure out what triggers your headaches. Avoiding triggers may help you prevent headaches.  Record when each headache began, how long it lasted, and what the pain was like (throbbing, aching, stabbing, or dull). Write down any other symptoms you had with the headache, such as nausea, flashing lights or dark spots, or sensitivity to bright light or loud noise. Note if the headache occurred near your period. List anything that might have triggered the headache, such as certain foods (chocolate, cheese, wine) or odors, smoke, bright light, stress, or lack of sleep. · Find healthy ways to deal with stress. Headaches are most common during or right after stressful times. Take time to relax before and after you do something that has caused a headache in the past.  · Try to keep your muscles relaxed by keeping good posture. Check your jaw, face, neck, and shoulder muscles for tension, and try relaxing them. When sitting at a desk, change positions often, and stretch for 30 seconds each hour. · Get plenty of sleep and exercise. · Eat regularly and well. Long periods without food can trigger a headache. · Treat yourself to a massage. Some people find that regular massages are very helpful in relieving tension. · Limit caffeine by not drinking too much coffee, tea, or soda. But don't quit caffeine suddenly, because that can also give you headaches. · Reduce eyestrain from computers by blinking frequently and looking away from the computer screen every so often. Make sure you have proper eyewear and that your monitor is set up properly, about an arm's length away. · Seek help if you have depression or anxiety. Your headaches may be linked to these conditions. Treatment can both prevent headaches and help with symptoms of anxiety or depression. When should you call for help? [id number] anytime you think you may need emergency care. For example, call if:  · You have signs of a stroke. These may include:  ? Sudden numbness, paralysis, or weakness in your face, arm, or leg, especially on only one side of your body. ? Sudden vision changes. ? Sudden trouble speaking.   ? Sudden confusion or trouble understanding simple statements. ? Sudden problems with walking or balance. ? A sudden, severe headache that is different from past headaches. Call your doctor now or seek immediate medical care if:  · You have a new or worse headache. · Your headache gets much worse. Where can you learn more? Go to https://chpepiceweb.[employer]. org and sign in to your [employer] account. Enter [id number] in the [employer] box to learn more about \"Headache: Care Instructions. \"     If you do not have an account, please click on the \"Sign Up Now\" link. Current as of: November 20, 2019               Content Version: 12.5  © 7492-0929 Healthwise, Incorporated. Care instructions adapted under license by [facility] ([facility]). If you have questions about a medical condition or this instruction, always ask your healthcare professional. [address] any warranty or liability for your use of this information.

## 2020-09-02 NOTE — PROGRESS NOTES
[facility]             [address], [address]                        Telephone [phone]             Fax [phone]     Kev Sibley  1975  RXZ:[id number]   Date of visit:  9/2/2020    Subjective:    Kev Sibley is a 50 y.o.  male who presents to [facility] today (9/2/2020) for evaluation of:    Chief Complaint   Patient presents with    Headache     fever,cough,runny nose ,sinus pressure,body aches,fatigue,not sleeping well started yesterday       Headache    This is a new problem. The current episode started yesterday. The problem occurs intermittently. The problem has been unchanged. The pain is located in the bilateral and occipital region. The pain does not radiate. The pain quality is similar to prior headaches. The quality of the pain is described as throbbing and aching. The pain is at a severity of 6/10. Associated symptoms include coughing, a fever (low grade), muscle aches, phonophobia, photophobia, rhinorrhea and sinus pressure. Pertinent negatives include no abdominal pain, dizziness, nausea, sore throat or vomiting. The symptoms are aggravated by noise and bright light. Treatments tried: ibuprofen. The treatment provided mild relief. Cough   This is a new problem. The current episode started yesterday. The problem has been unchanged. The problem occurs every few minutes. The cough is non-productive. Associated symptoms include chills, a fever (low grade), headaches, nasal congestion, postnasal drip and rhinorrhea. Pertinent negatives include no rash, sore throat, shortness of breath or wheezing. Nothing aggravates the symptoms. He has tried nothing for the symptoms. The treatment provided no relief.        He has the following problem list:  Patient Active Problem List   Diagnosis    Hypertension    GERD (gastroesophageal reflux disease)    Thyroid nodule    Vitamin D deficiency    Impaired fasting glucose    Hypogonadism male    YOSI - noncompliant CPAP    Status post bariatric surgery    Myopia of both eyes with astigmatism    Right shoulder pain    Mixed hyperlipidemia    Major depression, chronic    Obesity (BMI 30-39. 9)    Psoriasis    Herpes simplex virus (HSV) epithelial keratitis    Chorioretinal scar of right eye    Combined forms of age-related cataract of both eyes    Marginal corneal ulcer of left eye    Elevated BP without diagnosis of hypertension        Current medications are:  Current Outpatient Medications   Medication Sig Dispense Refill    lisinopril (PRINIVIL;ZESTRIL) 10 MG tablet Take 1 tablet by mouth daily 30 tablet 5    DULoxetine (CYMBALTA) 30 MG extended release capsule TAKE 2 CAPSULES BY MOUTH ONE TIME DAILY 180 capsule 3    Multiple Vitamin (MVI, CELEBRATE, CHEWABLE TABLET) Take 1 tablet by mouth 2 times daily.  CALCIUM CITRATE Take 1,500 mg by mouth daily Indications: bariatric adv       acyclovir (ZOVIRAX) 400 MG tablet TAKE 1 TABLET BY MOUTH 2 TIMES A DAY (Patient not taking: Reported on 9/2/2020) 60 tablet 5    betamethasone dipropionate 0.05 % lotion APPLY TO AFFECTED AREA(S) TWO TIMES A DAY AS NEEDED (Patient not taking: Reported on 9/2/2020) 30 mL 0    betamethasone valerate (VALISONE) 0.1 % cream APPLY TOPICALLY TWO TIMES A DAY AS NEEDED (Patient not taking: Reported on 9/2/2020) 30 g 0     No current facility-administered medications for this visit. He is allergic to effexor [venlafaxine]; shellfish-derived products; and morphine. Rebekah Santillan He  reports that he has never smoked. He has never used smokeless tobacco.      Objective:    Vitals:    09/02/20 1118   BP: (!) 138/98   Pulse: 100   Resp: 18   Temp: 98.6 °F (37 °C)   TempSrc: Tympanic   SpO2: 98%   Weight: 270 lb 12.8 oz (122.8 kg)   Height: 6' (1.829 m)     Body mass index is 36.73 kg/m².     Review of Systems   Constitutional: Positive for appetite change, chills, fatigue and fever (low grade). HENT: Positive for congestion, postnasal drip, rhinorrhea and sinus pressure. Negative for sore throat. Bilateral ear fullness   Eyes: Positive for photophobia. Respiratory: Positive for cough. Negative for shortness of breath and wheezing. Cardiovascular: Negative. Gastrointestinal: Negative. Negative for abdominal pain, nausea and vomiting. Skin: Negative for rash. Neurological: Positive for headaches. Negative for dizziness. Physical Exam  Vitals signs and nursing note reviewed. Constitutional:       Appearance: He is well-developed. HENT:      Head: Normocephalic. Jaw: There is normal jaw occlusion. Right Ear: Ear canal and external ear normal. A middle ear effusion is present. Left Ear: Ear canal and external ear normal. A middle ear effusion is present. Nose: Congestion present. Right Turbinates: Swollen (erythema). Left Turbinates: Swollen (erythema). Right Sinus: No maxillary sinus tenderness or frontal sinus tenderness. Left Sinus: No maxillary sinus tenderness or frontal sinus tenderness. Mouth/Throat:      Lips: Pink. Mouth: Mucous membranes are moist.      Pharynx: Uvula midline. Posterior oropharyngeal erythema present. Eyes:      Pupils: Pupils are equal, round, and reactive to light. Neck:      Musculoskeletal: Normal range of motion and neck supple. Cardiovascular:      Rate and Rhythm: Normal rate and regular rhythm. Heart sounds: Normal heart sounds. Pulmonary:      Effort: Pulmonary effort is normal.      Breath sounds: Normal breath sounds and air entry. Lymphadenopathy:      Cervical: No cervical adenopathy. Skin:     General: Skin is warm and dry. Neurological:      Mental Status: He is alert and oriented to person, place, and time. Psychiatric:         Behavior: Behavior normal.         Thought Content:  Thought content normal.       Assessment and Plan:    No results found for this visit on 09/02/20. Diagnosis Orders   1. Cough  Covid-19 Ambulatory   2. Acute nonintractable headache, unspecified headache type  Covid-19 Ambulatory   3. Person under investigation for COVID-19  Covid-19 Ambulatory     Self quarantine until negative Covid-19 test result received and symptoms improving. We will call with Covid-19 test results. I recommended that he use Coricidin HBP to help with congestion and cough. I also recommended Flonase and an antihistamine for sinus symptoms. he was also encouraged to use tylenol or ibuprofen for pain/fever. Increase water intake. Use cool mist humidifier at bedtime. Use nasal saline flush as needed. Good hand hygiene. he was instructed to return if there is no improvement or symptoms worsen. The use, risks, benefits, and side effects of prescribed or recommended medications were discussed. All questions were answered and the patient/caregiver voiced understanding. No orders of the defined types were placed in this encounter.         Electronically signed by MARI Carbajal CNP on 9/2/20 at 11:37 AM EDT

## 2020-09-04 ENCOUNTER — CARE COORDINATION (OUTPATIENT)
Dept: CASE MANAGEMENT | Age: 45
End: 2020-09-04

## 2020-09-04 NOTE — CARE COORDINATION
Yellow alert noted in Loop remote symptom monitoring program. Messaged patient to notify Melody Black if symptoms have worsened since yesterday. 1st Attempt to contact patient for follow up Covid 19 Care Transition call. Left message on Voice Mail to call office (number given) with any questions and an update on patient's condition since discharge. Will follow up at later time. Robles Lopez, 11:21 AM  I'm feeling a bit better. Jamel Shepherd LPN, 25:17 AM  I'm glad to hear that. If your symptoms worsen or are get severe - Please call your doctor / call 911 and/or go to the nearest Emergency Room.     Jamel Shepherd LPN     [address] [facility] / THE [facility]

## 2020-09-06 ENCOUNTER — CARE COORDINATION (OUTPATIENT)
Dept: CARE COORDINATION | Age: 45
End: 2020-09-06

## 2020-09-08 ENCOUNTER — TELEPHONE (OUTPATIENT)
Dept: INTERNAL MEDICINE | Age: 45
End: 2020-09-08

## 2020-09-08 LAB — SARS-COV-2, NAA: DETECTED

## 2020-09-08 NOTE — TELEPHONE ENCOUNTER
Patient called in and saw his results for covid test on mychart. He wants to know he should proceed since covid was detected. Please advise. Thank you!

## 2020-09-08 NOTE — TELEPHONE ENCOUNTER
Patient notified and symptoms are getting better, has not had a fever for a few days.   He has been quarantined in his bedroom away from other family members

## 2020-09-08 NOTE — TELEPHONE ENCOUNTER
Per CDC, he has to quarantine until his symptoms improve, and 10 days have passed from the beginning of his symptoms, and he has no fever (without the use of Tylenol or something similar). Moreover, his contacts after quarantine for 14 days since their last exposure to him. Obviously, if his symptoms are getting worse and he develops trouble breathing, chest pain, severe cough, that he should go to the emergency room.

## 2020-09-09 ENCOUNTER — TELEPHONE (OUTPATIENT)
Dept: ADMINISTRATIVE | Age: 45
End: 2020-09-09

## 2020-09-15 RX ORDER — VALACYCLOVIR HYDROCHLORIDE 1 G/1
TABLET, FILM COATED ORAL
Qty: 45 TABLET | Refills: 0 | Status: SHIPPED | OUTPATIENT
Start: 2020-09-15 | End: 2020-10-22

## 2020-09-21 ENCOUNTER — OFFICE VISIT (OUTPATIENT)
Dept: INTERNAL MEDICINE | Age: 45
End: 2020-09-21
Payer: COMMERCIAL

## 2020-09-21 VITALS
RESPIRATION RATE: 16 BRPM | HEIGHT: 72 IN | HEART RATE: 78 BPM | SYSTOLIC BLOOD PRESSURE: 132 MMHG | BODY MASS INDEX: 36.7 KG/M2 | TEMPERATURE: 97.5 F | DIASTOLIC BLOOD PRESSURE: 76 MMHG | WEIGHT: 271 LBS

## 2020-09-21 PROCEDURE — 99214 OFFICE O/P EST MOD 30 MIN: CPT | Performed by: INTERNAL MEDICINE

## 2020-09-22 ENCOUNTER — TELEPHONE (OUTPATIENT)
Dept: OTOLARYNGOLOGY | Age: 45
End: 2020-09-22

## 2020-09-22 NOTE — PROGRESS NOTES
[place] [address]  Dept: [phone]  Dept Fax: [phone]  Loc: [phone]     Visit Date:  9/21/2020    Patient:  Eric Webster  YOB: 1975  Provider: Joaquina Henderson MD        HPI:   He was seen in the internal medicine office today for   Chief Complaint   Patient presents with    Otalgia     right cannot hear out of           Recovered from COVID-19 infection. Says that his symptoms were mostly like a cold or flu. Says that he did not require hospitalization. Says that he quarantine for 14 days, and he also says that his wife and both his daughters contracted COVID-23 infection as well. They all quarantine for 14 days. Does not have any fever, chest pain, shortness of breath, cough at this time. However, he says that he has been having hearing loss in his right ear that started around the time he had a COVID-19 infection. Says that he often does not hear well out of his left ear, and is not sure why, and now has a problem with his right ear. Denies ear pain, ringing, discharge. Already received antibiotics for possible otitis media. 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 Has a history of hypertension which has been stable.     Has a history of depression which has been stable      Subjective:      REVIEW OF SYSTEMS    Constitutional: Denies fever, chills  Eyes: Denies recent vision changes  Cardiovascular: Denies chest pain, LL edema  Respiratory: Denies cough, wheezes  Gastrointestinal: Denies abdominal pain, nausea, vomiting  Skin: Denies rash  Endocrine: Denies polyuria  Hematologic: Denies bruising  Genitourinary: Denies dysuria, hematuria  Neurological: Denies headache, numbness        Medications    Current Outpatient Medications:     valACYclovir (VALTREX) 1 g tablet, TAKE 1 TABLET BY MOUTH 3 TIMES A DAY X15D (Patient taking differently: Take 1,000 mg by mouth daily ), Disp: 45 tablet, Rfl: 0    lisinopril (PRINIVIL;ZESTRIL) 10 MG tablet, Take 1 tablet by mouth daily, Disp: 30 tablet, Rfl: 5    DULoxetine (CYMBALTA) 30 MG extended release capsule, TAKE 2 CAPSULES BY MOUTH ONE TIME DAILY, Disp: 180 capsule, Rfl: 3    betamethasone dipropionate 0.05 % lotion, APPLY TO AFFECTED AREA(S) TWO TIMES A DAY AS NEEDED, Disp: 30 mL, Rfl: 0    betamethasone valerate (VALISONE) 0.1 % cream, APPLY TOPICALLY TWO TIMES A DAY AS NEEDED, Disp: 30 g, Rfl: 0    Multiple Vitamin (MVI, CELEBRATE, CHEWABLE TABLET), Take 1 tablet by mouth 2 times daily. , Disp: , Rfl:     CALCIUM CITRATE, Take 1,500 mg by mouth daily Indications: bariatric adv , Disp: , Rfl:     acyclovir (ZOVIRAX) 400 MG tablet, TAKE 1 TABLET BY MOUTH 2 TIMES A DAY (Patient not taking: Reported on 9/21/2020), Disp: 60 tablet, Rfl: 5    The patient is allergic to effexor [venlafaxine]; shellfish-derived products; and morphine. Past Medical History  Hayden Sandhoff  has a past medical history of Depression, Gastric bypass status for obesity, GERD (gastroesophageal reflux disease), Hyperlipidemia, Hypertension, Hypogonadism male, Impaired fasting glucose, Obesity, Obstructive sleep apnea, Psoriasis, Sensorineural hearing loss, Thyroid nodule, and Vitamin D deficiency. Past Surgical History  The patient  has a past surgical history that includes knee surgery (1994, 2009); [place] tooth extraction (1997); Upper gastrointestinal endoscopy (2008); and Jose Alfredo-en-Y Gastric Bypass (09/18/2013).     Family History  This patient's family history includes Arthritis in his maternal grandmother and mother; Asthma in his brother; Cancer in his sister; Depression in his mother; Diabetes in his maternal aunt and mother; Glaucoma in an other family member; Hearing Loss in his paternal grandfather; Heart Disease in his maternal grandfather and mother; High Blood Pressure in his maternal grandfather, maternal uncle, and mother; High Cholesterol in his maternal aunt, maternal uncle, and mother; Other in his mother; Thyroid Disease in his mother. Social History  Angelo Nagel  reports that he has never smoked. He has never used smokeless tobacco. He reports that he does not drink alcohol or use drugs. Health Maintenance:    Health Maintenance   Topic Date Due    Flu vaccine (1) 09/01/2020    A1C test (Diabetic or Prediabetic)  07/31/2021    Potassium monitoring  07/31/2021    Creatinine monitoring  07/31/2021    Lipid screen  07/31/2025    DTaP/Tdap/Td vaccine (3 - Td) 06/28/2026    HIV screen  Addressed    Hepatitis A vaccine  Aged Out    Hepatitis B vaccine  Aged Out    Hib vaccine  Aged Out    Meningococcal (ACWY) vaccine  Aged Out    Pneumococcal 0-64 years Vaccine  Aged Out           Objective:     PHYSICAL EXAM  /76 (Site: Left Upper Arm, Position: Sitting, Cuff Size: Large Adult)   Pulse 78   Temp 97.5 °F (36.4 °C) (Infrared)   Resp 16   Ht 6' (1.829 m)   Wt 271 lb (122.9 kg)   BMI 36.75 kg/m²   Constitutional: No acute distress. Sits in chair comfortably  Eyes: Sclerae nonicteric. No lid lag or proptosis  HENT: Possibly clotted blood in both tympanic membranes, possible impacted cerumen  Neck: No gross masses. Trachea visibly midline  Respiratory: Good air entry bilaterally. No wheezing or crackles  Cardiovascular: Normal S1-S2. No murmurs. No lower extremity edema  Gastrointestinal: No visible masses. No visible hernias  Skin: No abnormal rashes. No abnormal masses  Neurologic: Cranial nerves grossly intact  Psychiatric: Normal affect.  Alert and oriented        Labs Reviewed 9/21/2020:    Lab Results   Component Value Date    WBC 4.6 07/31/2020    HGB 14.8 07/31/2020    HCT 42.3 07/31/2020     07/31/2020    CHOL 235 (H) 07/31/2020    TRIG 135 07/31/2020    HDL 41 07/31/2020    ALT 31 07/31/2020    AST 23 07/31/2020     07/31/2020    K 4.4 07/31/2020     07/31/2020    CREATININE 0.96 07/31/2020    BUN 12 07/31/2020    CO2 28 07/31/2020    TSH 0.86 01/25/2020    PSA 0.42 07/31/2020    LABA1C 5.3 07/31/2020       Plan        Hearing loss: Cause unclear. Examination of the ear shows possibly impacted cerumen or clotted blood in both tympanic membranes, however the exam was not conclusive. Will refer to ENT    Hypertension: Blood pressure controlled. Continue same management. Depression: Stable. Continue same management. Diagnosis Orders   1. Hearing loss, unspecified hearing loss type, unspecified laterality  Greer Chowdary MD, Otolaryngology, [place]         No follow-ups on file. Patient given educational materials - see patient instructions. Discussed use, benefit, and side effects of prescribed medications. All patient questions answered. Pt voiced understanding. Reviewed health maintenance. Electronically signed Kiersten Pan MD on 9/21/2020 at 2:55 PM      This note has been created using the Epic electronic health record, and dictated in part by [employer] dictation system. Despite the documenting physician's best efforts, there may be errors in spelling, grammar or syntax.

## 2020-09-22 NOTE — TELEPHONE ENCOUNTER
Please review patient's hx of ear problem. Scheduled appointment with Dr Tasneem Garcia for 10/01/2020, Thursday,  Patient is requesting a sooner appointment.   Please call him at [phone]

## 2020-10-01 ENCOUNTER — OFFICE VISIT (OUTPATIENT)
Dept: OTOLARYNGOLOGY | Age: 45
End: 2020-10-01
Payer: COMMERCIAL

## 2020-10-01 VITALS
SYSTOLIC BLOOD PRESSURE: 130 MMHG | TEMPERATURE: 97.1 F | BODY MASS INDEX: 36.7 KG/M2 | WEIGHT: 271 LBS | HEART RATE: 72 BPM | DIASTOLIC BLOOD PRESSURE: 84 MMHG | HEIGHT: 72 IN | RESPIRATION RATE: 14 BRPM

## 2020-10-01 PROCEDURE — 99203 OFFICE O/P NEW LOW 30 MIN: CPT | Performed by: OTOLARYNGOLOGY

## 2020-10-01 PROCEDURE — 69210 REMOVE IMPACTED EAR WAX UNI: CPT | Performed by: OTOLARYNGOLOGY

## 2020-10-01 NOTE — PROGRESS NOTES
10/1/2020 9:51 AM EDT    Chief Complaint:   Chief Complaint   Patient presents with    Hearing Loss       The patient is a 50y.o.  year old  male  who presents with a complaint of right hearing loss with onset 3-4 weeks ago. He and his family were diagnosed with covid during that same time and were in isolation. About 1 week into isolation he developed sudden worsening of his right hearing which used to be his \"good\" ear. It has improved since it started but has been stable for about 2-3 weeks. Had audio several years ago that showed right sided hearing loss. He declined an aid at that time. 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 Some tinnitus bilateral that started several weeks ago as well. Prone to motion sickness. When he stands up he gets a dizzy sensation for 5-10 seconds. Feels like passing out but hasn't. Sits down and it passes. Has been told it may be related to BP/dehydration. There was no specific precipitating event. There are no aggravating or mitigating factors. Social History     Substance and Sexual Activity   Alcohol Use No    Comment: occassional, pt willing to avoid for at least 6 month post bariatric surgery     Social History     Tobacco Use   Smoking Status Never Smoker   Smokeless Tobacco Never Used     Drug Sensitivities: Effexor [venlafaxine]; Shellfish-derived products; and Morphine  Medications:  Outpatient Medications Prior to Visit   Medication Sig Dispense Refill    valACYclovir (VALTREX) 1 g tablet TAKE 1 TABLET BY MOUTH 3 TIMES A DAY X15D (Patient taking differently: Take 1,000 mg by mouth daily ) 45 tablet 0    lisinopril (PRINIVIL;ZESTRIL) 10 MG tablet Take 1 tablet by mouth daily 30 tablet 5    DULoxetine (CYMBALTA) 30 MG extended release capsule TAKE 2 CAPSULES BY MOUTH ONE TIME DAILY 180 capsule 3    Multiple Vitamin (MVI, CELEBRATE, CHEWABLE TABLET) Take 1 tablet by mouth 2 times daily.       CALCIUM CITRATE Take 1,500 mg by mouth daily Indications: bariatric adv       acyclovir (ZOVIRAX) 400 MG tablet TAKE 1 TABLET BY MOUTH 2 TIMES A DAY (Patient not taking: Reported on 9/21/2020) 60 tablet 5    betamethasone dipropionate 0.05 % lotion APPLY TO AFFECTED AREA(S) TWO TIMES A DAY AS NEEDED (Patient not taking: Reported on 10/1/2020) 30 mL 0    betamethasone valerate (VALISONE) 0.1 % cream APPLY TOPICALLY TWO TIMES A DAY AS NEEDED (Patient not taking: Reported on 10/1/2020) 30 g 0     No facility-administered medications prior to visit. Past Medical History:   Diagnosis Date    Depression     /obsessive-compulsive disorder, Dr. Maira Jalloh.  Gastric bypass status for obesity 9/6/13    preop wt 338.2lb    GERD (gastroesophageal reflux disease)     1) EGD 06/08 with minimal esophagitis, not confirmed with biopsy.  Hyperlipidemia     2% risk over 10 years on calculator December 2014    Hypertension     Hypogonadism male     1) Erectile dysfunction. 2) Gynecomastia. 3) Dr. Allison Castro, endocrinology, [facility] evaluation 07/08. 4) MRI of head 08/06.  Impaired fasting glucose     Obesity     BMI 45, 06/08., Bariatric surg 9/13    Obstructive sleep apnea     11/08, CPAP 10.- noncompliant 2016    Psoriasis     Treated with topical steroids Dr Melton Ran   Betamethasone cream and lotion    Sensorineural hearing loss     1) Dr. Dominguez Brand evaluation, 08/06. MRI of head negative 08/06.  Thyroid nodule     right sided, fine needle aspiration 01/09 by Dr. Allison Castro negative. 1) Repeat ultrasound 06/10 stable  Dr. Allison Castro. 2) Fine needle aspiration repeat 10/11, negative.  Vitamin D deficiency     (Dr. Allison Castro).      Past Surgical History:   Procedure Laterality Date    KNEE SURGERY  1994, 2009    right acl tear    ATUL-EN-Y GASTRIC BYPASS  09/18/2013    laproscopic    UPPER GASTROINTESTINAL ENDOSCOPY  2008    WISDOM TOOTH EXTRACTION  1997     Family History   Problem Relation Age of Onset    Arthritis Mother     Depression Mother     Heart Disease Mother     High Blood Pressure Mother     High Cholesterol Mother     Diabetes Mother     Other Mother         stent placed at age 79    Thyroid Disease Mother     Arthritis Maternal Grandmother     Heart Disease Maternal Grandfather     High Blood Pressure Maternal Grandfather     Cancer Sister         breast    Asthma Brother     Diabetes Maternal Aunt     High Cholesterol Maternal Aunt     High Blood Pressure Maternal Uncle     High Cholesterol Maternal Uncle     Hearing Loss Paternal Grandfather     Glaucoma Other      ROS:   Blood pressure 130/84, pulse 72, temperature 97.1 °F (36.2 °C), temperature source Tympanic, resp. rate 14, height 6' (1.829 m), weight 271 lb (122.9 kg). General: The patient is found to be alert and normally responsive male with grossly normal hearing, clear voice and normal articulation. Communication is without difficulty. Voice: Clear   Skin: The skin has normal colour and turgor. Face: The facial contour is symmetric at rest and with movement. Ears: The pinnae have normal contours. AD: EAC with cerumen, TM intact, no effusion/erythema/retraction   AS: EAC with cerumen, TM intact, no effusion/erythema/retraction   AS: Cerumen removed with suction and curette. AD: Cerumen removed with suction and curette. Immediate improvement in hearing bilaterally upon cerumen removal  Eye: The ocular movements are full and symmetric, the conjunctiva is unremarkable; sclera are anicteric, pupillary response is symmetric. No nystagmus is found. Nose:   The external nasal contour is normal    IMP:  No diagnosis found. Plan:     Recommend audio   Fu after audio   Discussed that the audio would show if there has been worsening of the snhl that may require steroids  May be caused by coronavirus inflammation   Repeat ear cleaning in 1 year     Orders: No orders of the defined types were placed in this encounter.        Rx:   No orders of the defined types were placed in this encounter.

## 2020-10-22 RX ORDER — VALACYCLOVIR HYDROCHLORIDE 1 G/1
1000 TABLET, FILM COATED ORAL DAILY
Qty: 45 TABLET | Refills: 0 | Status: SHIPPED | OUTPATIENT
Start: 2020-10-22 | End: 2020-12-03

## 2020-12-03 RX ORDER — VALACYCLOVIR HYDROCHLORIDE 1 G/1
TABLET, FILM COATED ORAL
Qty: 45 TABLET | Refills: 11 | Status: SHIPPED | OUTPATIENT
Start: 2020-12-03 | End: 2021-12-14

## 2020-12-17 RX ORDER — LISINOPRIL 10 MG/1
TABLET ORAL
Qty: 90 TABLET | Refills: 3 | Status: SHIPPED | OUTPATIENT
Start: 2020-12-17 | End: 2021-02-09

## 2021-01-04 ENCOUNTER — OFFICE VISIT (OUTPATIENT)
Dept: OTOLARYNGOLOGY | Age: 46
End: 2021-01-04
Payer: COMMERCIAL

## 2021-01-04 VITALS
TEMPERATURE: 97.2 F | WEIGHT: 271 LBS | SYSTOLIC BLOOD PRESSURE: 158 MMHG | HEIGHT: 72 IN | BODY MASS INDEX: 36.7 KG/M2 | DIASTOLIC BLOOD PRESSURE: 102 MMHG

## 2021-01-04 DIAGNOSIS — H90.A32 MIXED CONDUCTIVE AND SENSORINEURAL HEARING LOSS OF LEFT EAR WITH RESTRICTED HEARING OF RIGHT EAR: Primary | ICD-10-CM

## 2021-01-04 DIAGNOSIS — H90.A21 SENSORINEURAL HEARING LOSS (SNHL) OF RIGHT EAR WITH RESTRICTED HEARING OF LEFT EAR: ICD-10-CM

## 2021-01-04 DIAGNOSIS — H93.13 TINNITUS OF BOTH EARS: ICD-10-CM

## 2021-01-04 PROCEDURE — 99213 OFFICE O/P EST LOW 20 MIN: CPT | Performed by: OTOLARYNGOLOGY

## 2021-01-04 NOTE — PROGRESS NOTES
1/4/2021 9:51 AM EDT    Chief Complaint:   No chief complaint on file. The patient is a 50y.o.  year old  male  who presents with a complaint of right hearing loss with onset 3-4 weeks ago. He and his family were diagnosed with covid during that same time and were in isolation. About 1 week into isolation he developed sudden worsening of his right hearing which used to be his \"good\" ear. It has improved since it started but has been stable for about 2-3 weeks. Had audio several years ago that showed right sided hearing loss. He declined an aid at that time. 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 Some tinnitus bilateral that started several weeks ago as well. Prone to motion sickness. When he stands up he gets a dizzy sensation for 5-10 seconds. Feels like passing out but hasn't. Sits down and it passes. Has been told it may be related to BP/dehydration. Today [facility] states that the hearing is back to baseline. He has known left-sided hearing loss. States that he has difficulty reading lips with the masks. His wife states that he listens to the TV loudly. He does not notice significant quality of life impact from any hearing loss. No concerns with sound localization. He has a consultation set up to discuss hearing aids. [facility] is here to review audiogram on December 23. Right hearing mild to moderate sensorineural hearing loss at 1.5 to 6 kHz  Left hearing essentially moderate mixed hearing loss  Conductive component on the left at 250 500 1000hz. Type A tympanometry bilateral  Word recognition score 96% bilateral    There was no specific precipitating event. There are no aggravating or mitigating factors.     Social History     Substance and Sexual Activity   Alcohol Use No    Comment: occassional, pt willing to avoid for at least 6 month post bariatric surgery     Social History     Tobacco Use   Smoking Status Never Smoker   Smokeless Tobacco Never Used Drug Sensitivities: Effexor [venlafaxine], Shellfish-derived products, and Morphine  Medications:  Outpatient Medications Prior to Visit   Medication Sig Dispense Refill    lisinopril (PRINIVIL;ZESTRIL) 10 MG tablet TAKE 1 TABLET BY MOUTH ONE TIME DAILY 90 tablet 3    valACYclovir (VALTREX) 1 g tablet TAKE 1 TABLET BY MOUTH ONE TIME A DAY 45 tablet 11    DULoxetine (CYMBALTA) 30 MG extended release capsule TAKE 2 CAPSULES BY MOUTH ONE TIME DAILY 180 capsule 3    Multiple Vitamin (MVI, CELEBRATE, CHEWABLE TABLET) Take 1 tablet by mouth 2 times daily.  acyclovir (ZOVIRAX) 400 MG tablet TAKE 1 TABLET BY MOUTH 2 TIMES A DAY (Patient not taking: Reported on 9/21/2020) 60 tablet 5    CALCIUM CITRATE Take 1,500 mg by mouth daily Indications: bariatric adv        No facility-administered medications prior to visit. Past Medical History:   Diagnosis Date    Depression     /obsessive-compulsive disorder, Dr. Chelsi Hathaway.  Gastric bypass status for obesity 9/6/13    preop wt 338.2lb    GERD (gastroesophageal reflux disease)     1) EGD 06/08 with minimal esophagitis, not confirmed with biopsy.  Hyperlipidemia     2% risk over 10 years on calculator December 2014    Hypertension     Hypogonadism male     1) Erectile dysfunction. 2) Gynecomastia. 3) Dr. Loli Sam, endocrinology, [facility] evaluation 07/08. 4) MRI of head 08/06.  Impaired fasting glucose     Obesity     BMI 45, 06/08., Bariatric surg 9/13    Obstructive sleep apnea     11/08, CPAP 10.- noncompliant 2016    Psoriasis     Treated with topical steroids Dr Jessica Ulloa   Betamethasone cream and lotion    Sensorineural hearing loss     1) Dr. Moraes Draft evaluation, 08/06. MRI of head negative 08/06.  Thyroid nodule     right sided, fine needle aspiration 01/09 by Dr. Loli Sam negative. 1) Repeat ultrasound 06/10 stable  Dr. Loli Sam. 2) Fine needle aspiration repeat 10/11, negative.  Vitamin D deficiency     (Dr. Loli Sam). Past Surgical History:   Procedure Laterality Date    KNEE SURGERY  1994, 2009    right acl tear    ATUL-EN-Y GASTRIC BYPASS  09/18/2013    laproscopic    UPPER GASTROINTESTINAL ENDOSCOPY  2008    WISDOM TOOTH EXTRACTION  1997     Family History   Problem Relation Age of Onset    Arthritis Mother     Depression Mother     Heart Disease Mother     High Blood Pressure Mother     High Cholesterol Mother     Diabetes Mother     Other Mother         stent placed at age 79    Thyroid Disease Mother     Arthritis Maternal Grandmother     Heart Disease Maternal Grandfather     High Blood Pressure Maternal Grandfather     Cancer Sister         breast    Asthma Brother     Diabetes Maternal Aunt     High Cholesterol Maternal Aunt     High Blood Pressure Maternal Uncle     High Cholesterol Maternal Uncle     Hearing Loss Paternal Grandfather     Glaucoma Other      ROS:   Blood pressure (!) 158/102, temperature 97.2 °F (36.2 °C), temperature source Temporal, height 6' (1.829 m), weight 271 lb (122.9 kg). General: The patient is found to be alert and normally responsive male with grossly normal hearing, clear voice and normal articulation. Communication is without difficulty. Voice: Clear   Skin: The skin has normal colour and turgor. Face: The facial contour is symmetric at rest and with movement. Ears: The pinnae have normal contours. AD: EAC clear, TM intact, no effusion/erythema/retraction   AS: EAC clear, TM intact, no effusion/erythema/retraction   Eye: The ocular movements are full and symmetric, the conjunctiva is unremarkable; sclera are anicteric, pupillary response is symmetric. No nystagmus is found. Nose:   The external nasal contour is normal    IMP:    1. Mixed conductive and sensorineural hearing loss of left ear with restricted hearing of right ear    2. Sensorineural hearing loss (SNHL) of right ear with restricted hearing of left ear    3.  Tinnitus of both ears Plan:     Asymmetric audiogram with worse hearing on the left side however asymmetry is mostly attributed to a conductive component with essentially symmetrical bone lines. No evidence of effusion or infection. Bilateral ears in the high frequencies demonstrate a cookie bite pattern   Cochlear otosclerosis can be consistent with a cookie bite pattern with a mixed hearing loss pattern. Cookie bite hearing loss can also be associated with hereditary hearing loss which is at the top of the differential given his young age. Discussed options for rehab including referral to otology for middle ear exploration consultation, hearing aid, VNG balance test, and observation. He states that he will discuss these options with his wife. He is medically cleared for a hearing aid at this time. He is leaning towards observation. Recommend annual audiogram.  Recommend sooner follow-up if dizziness or hearing loss progresses. Orders: No orders of the defined types were placed in this encounter. Rx:   No orders of the defined types were placed in this encounter.

## 2021-02-08 ENCOUNTER — HOSPITAL ENCOUNTER (OUTPATIENT)
Dept: LAB | Age: 46
Discharge: HOME | End: 2021-02-08
Payer: COMMERCIAL

## 2021-02-08 ENCOUNTER — OFFICE VISIT (OUTPATIENT)
Dept: INTERNAL MEDICINE | Age: 46
End: 2021-02-08
Payer: COMMERCIAL

## 2021-02-08 VITALS
WEIGHT: 274 LBS | DIASTOLIC BLOOD PRESSURE: 70 MMHG | SYSTOLIC BLOOD PRESSURE: 130 MMHG | RESPIRATION RATE: 16 BRPM | BODY MASS INDEX: 37.11 KG/M2 | HEART RATE: 74 BPM | HEIGHT: 72 IN

## 2021-02-08 DIAGNOSIS — H91.90 HEARING LOSS, UNSPECIFIED HEARING LOSS TYPE, UNSPECIFIED LATERALITY: ICD-10-CM

## 2021-02-08 DIAGNOSIS — E78.2 MIXED HYPERLIPIDEMIA: ICD-10-CM

## 2021-02-08 DIAGNOSIS — E66.9 OBESITY (BMI 30-39.9): ICD-10-CM

## 2021-02-08 DIAGNOSIS — R73.01 IMPAIRED FASTING GLUCOSE: ICD-10-CM

## 2021-02-08 DIAGNOSIS — I10 HYPERTENSION, UNSPECIFIED TYPE: Primary | ICD-10-CM

## 2021-02-08 LAB
ABSOLUTE EOS #: 0.1 K/UL (ref 0–0.44)
ABSOLUTE IMMATURE GRANULOCYTE: <0.03 K/UL (ref 0–0.3)
ABSOLUTE LYMPH #: 2.34 K/UL (ref 1.1–3.7)
ABSOLUTE MONO #: 0.48 K/UL (ref 0.1–1.2)
ALBUMIN SERPL-MCNC: 4.5 G/DL (ref 3.5–5.2)
ALBUMIN/GLOBULIN RATIO: 1.7 (ref 1–2.5)
ALP BLD-CCNC: 82 U/L (ref 40–129)
ALT SERPL-CCNC: 30 U/L (ref 5–41)
ANION GAP SERPL CALCULATED.3IONS-SCNC: 6 MMOL/L (ref 9–17)
AST SERPL-CCNC: 22 U/L
BASOPHILS # BLD: 0 % (ref 0–2)
BASOPHILS ABSOLUTE: 0.03 K/UL (ref 0–0.2)
BILIRUB SERPL-MCNC: 0.25 MG/DL (ref 0.3–1.2)
BUN BLDV-MCNC: 17 MG/DL (ref 6–20)
BUN/CREAT BLD: 19 (ref 9–20)
CALCIUM SERPL-MCNC: 9.3 MG/DL (ref 8.6–10.4)
CHLORIDE BLD-SCNC: 102 MMOL/L (ref 98–107)
CO2: 31 MMOL/L (ref 20–31)
CREAT SERPL-MCNC: 0.9 MG/DL (ref 0.7–1.2)
DIFFERENTIAL TYPE: ABNORMAL
EOSINOPHILS RELATIVE PERCENT: 2 % (ref 1–4)
GFR AFRICAN AMERICAN: >60 ML/MIN
GFR NON-AFRICAN AMERICAN: >60 ML/MIN
GFR SERPL CREATININE-BSD FRML MDRD: ABNORMAL ML/MIN/{1.73_M2}
GFR SERPL CREATININE-BSD FRML MDRD: ABNORMAL ML/MIN/{1.73_M2}
GLUCOSE BLD-MCNC: 110 MG/DL (ref 70–99)
HCT VFR BLD CALC: 43.2 % (ref 40.7–50.3)
HEMOGLOBIN: 14.6 G/DL (ref 13–17)
IMMATURE GRANULOCYTES: 0 %
LYMPHOCYTES # BLD: 35 % (ref 24–43)
MCH RBC QN AUTO: 32 PG (ref 25.2–33.5)
MCHC RBC AUTO-ENTMCNC: 33.8 G/DL (ref 25.2–33.5)
MCV RBC AUTO: 94.7 FL (ref 82.6–102.9)
MONOCYTES # BLD: 7 % (ref 3–12)
NRBC AUTOMATED: 0 PER 100 WBC
PDW BLD-RTO: 13 % (ref 11.8–14.4)
PLATELET # BLD: 205 K/UL (ref 138–453)
PLATELET ESTIMATE: ABNORMAL
PMV BLD AUTO: 9.7 FL (ref 8.1–13.5)
POTASSIUM SERPL-SCNC: 5.1 MMOL/L (ref 3.7–5.3)
RBC # BLD: 4.56 M/UL (ref 4.21–5.77)
RBC # BLD: ABNORMAL 10*6/UL
SEG NEUTROPHILS: 56 % (ref 36–65)
SEGMENTED NEUTROPHILS ABSOLUTE COUNT: 3.75 K/UL (ref 1.5–8.1)
SODIUM BLD-SCNC: 139 MMOL/L (ref 135–144)
TOTAL PROTEIN: 7.2 G/DL (ref 6.4–8.3)
WBC # BLD: 6.7 K/UL (ref 3.5–11.3)
WBC # BLD: ABNORMAL 10*3/UL

## 2021-02-08 PROCEDURE — 36415 COLL VENOUS BLD VENIPUNCTURE: CPT

## 2021-02-08 PROCEDURE — 85025 COMPLETE CBC W/AUTO DIFF WBC: CPT

## 2021-02-08 PROCEDURE — 80053 COMPREHEN METABOLIC PANEL: CPT

## 2021-02-08 PROCEDURE — 83036 HEMOGLOBIN GLYCOSYLATED A1C: CPT

## 2021-02-08 PROCEDURE — 99214 OFFICE O/P EST MOD 30 MIN: CPT | Performed by: INTERNAL MEDICINE

## 2021-02-08 PROCEDURE — 80061 LIPID PANEL: CPT

## 2021-02-08 RX ORDER — DULOXETIN HYDROCHLORIDE 60 MG/1
60 CAPSULE, DELAYED RELEASE ORAL DAILY
Qty: 90 CAPSULE | Refills: 3 | Status: SHIPPED | OUTPATIENT
Start: 2021-02-08 | End: 2022-02-10 | Stop reason: SDUPTHER

## 2021-02-08 RX ORDER — EPINEPHRINE 0.15 MG/.3ML
INJECTION INTRAMUSCULAR
Qty: 2 DEVICE | Refills: 3 | Status: SHIPPED | OUTPATIENT
Start: 2021-02-08 | End: 2022-02-10

## 2021-02-08 RX ORDER — DULOXETIN HYDROCHLORIDE 60 MG/1
60 CAPSULE, DELAYED RELEASE ORAL DAILY
COMMUNITY
End: 2021-02-08 | Stop reason: SDUPTHER

## 2021-02-09 LAB
CHOLESTEROL/HDL RATIO: 5.3
CHOLESTEROL: 211 MG/DL
ESTIMATED AVERAGE GLUCOSE: 108 MG/DL
HBA1C MFR BLD: 5.4 % (ref 4–6)
HDLC SERPL-MCNC: 40 MG/DL
LDL CHOLESTEROL: 130 MG/DL (ref 0–130)
TRIGL SERPL-MCNC: 206 MG/DL
VLDLC SERPL CALC-MCNC: ABNORMAL MG/DL (ref 1–30)

## 2021-02-09 RX ORDER — LISINOPRIL 10 MG/1
TABLET ORAL
Qty: 30 TABLET | Refills: 5 | Status: SHIPPED | OUTPATIENT
Start: 2021-02-09 | End: 2021-08-09 | Stop reason: SDUPTHER

## 2021-02-09 RX ORDER — EPINEPHRINE 0.3 MG/.3ML
INJECTION SUBCUTANEOUS
COMMUNITY

## 2021-02-09 NOTE — PROGRESS NOTES
[place] [address]  Dept: [phone]  Dept Fax: [phone]  Loc: [phone]     Visit Date:  2/8/2021  Patient:  Louie Bernstein  YOB: 1975  Provider: Berta Vazquez MD    Assessment & Plan      Hearing loss: Chronic, probably hereditary. Was seen by ENT who recommended hearing aids. ENT note reviewed. Says that he will think about it. However, says that his hearing is back to his previous baseline. Hypertension: Blood pressure controlled today in clinic. Continue same management. Impaired fasting glucose: We will continue to monitor. Will order A1c today. Hyperlipidemia: We will order lipid panel today. Diagnosis Orders   1. Hypertension, unspecified type  CBC Auto Differential    Comprehensive Metabolic Panel   2. Mixed hyperlipidemia  Lipid Panel   3. Impaired fasting glucose  Hemoglobin A1C   4. Hearing loss, unspecified hearing loss type, unspecified laterality         Discussed use, benefit, and side effects of prescribed medications. All questions answered. Patient voiced understanding. Reviewed health maintenance. HPI:     He was seen in the internal medicine office today for   Chief Complaint   Patient presents with    Blood Sugar Problem    Hypertension    Hyperlipidemia        Presents to follow-up. Reports that his hearing is back to his previous baseline. He has a chronic hearing loss, which is probably hereditary. Was seen by ENT who recommended hearing aids. He had worsened hearing after COVID-19 infection, but says that it is now back to his previous baseline. Has a history of hyperlipidemia which has been unchanged. Has a history of hypertension which has been under control.     Has a history of impaired fasting glucose which has been under control    Subjective:      REVIEW OF SYSTEMS    Constitutional: Denies fever, chills Cardiovascular: Normal S1-S2. No murmurs. No lower extremity edema  Gastrointestinal: No visible masses. No visible hernias  Skin: No abnormal rashes. No abnormal masses  Neurologic: Cranial nerves grossly intact  Psychiatric: Normal affect. Alert and oriented        Labs Reviewed 2/8/2021:    Lab Results   Component Value Date    WBC 6.7 02/08/2021    HGB 14.6 02/08/2021    HCT 43.2 02/08/2021     02/08/2021    CHOL 235 (H) 07/31/2020    TRIG 135 07/31/2020    HDL 41 07/31/2020    ALT 30 02/08/2021    AST 22 02/08/2021     02/08/2021    K 5.1 02/08/2021     02/08/2021    CREATININE 0.90 02/08/2021    BUN 17 02/08/2021    CO2 31 02/08/2021    TSH 0.86 01/25/2020    PSA 0.42 07/31/2020    LABA1C 5.3 07/31/2020            Electronically signed Guille MARQUIS MD on 2/8/2021 at 10:11 AM      This note has been created using the Epic electronic health record, and dictated in part by [employer] dictation system. Despite the documenting physician's best efforts, there may be errors in spelling, grammar or syntax.

## 2021-03-15 DIAGNOSIS — M25.511 BILATERAL SHOULDER PAIN, UNSPECIFIED CHRONICITY: Primary | ICD-10-CM

## 2021-03-15 DIAGNOSIS — M25.512 BILATERAL SHOULDER PAIN, UNSPECIFIED CHRONICITY: Primary | ICD-10-CM

## 2021-03-15 DIAGNOSIS — M25.561 RIGHT KNEE PAIN, UNSPECIFIED CHRONICITY: ICD-10-CM

## 2021-03-16 ENCOUNTER — OFFICE VISIT (OUTPATIENT)
Dept: ORTHOPEDIC SURGERY | Age: 46
End: 2021-03-16
Payer: COMMERCIAL

## 2021-03-16 VITALS
BODY MASS INDEX: 36.98 KG/M2 | HEART RATE: 74 BPM | DIASTOLIC BLOOD PRESSURE: 91 MMHG | WEIGHT: 273 LBS | HEIGHT: 72 IN | SYSTOLIC BLOOD PRESSURE: 140 MMHG

## 2021-03-16 DIAGNOSIS — M17.11 PRIMARY OSTEOARTHRITIS OF RIGHT KNEE: ICD-10-CM

## 2021-03-16 DIAGNOSIS — M24.111 DEGENERATIVE TEAR OF GLENOID LABRUM OF RIGHT SHOULDER: Primary | ICD-10-CM

## 2021-03-16 PROCEDURE — 20611 DRAIN/INJ JOINT/BURSA W/US: CPT | Performed by: PHYSICIAN ASSISTANT

## 2021-03-16 PROCEDURE — 99203 OFFICE O/P NEW LOW 30 MIN: CPT | Performed by: PHYSICIAN ASSISTANT

## 2021-03-16 RX ORDER — METHYLPREDNISOLONE ACETATE 40 MG/ML
40 INJECTION, SUSPENSION INTRA-ARTICULAR; INTRALESIONAL; INTRAMUSCULAR; SOFT TISSUE ONCE
Status: COMPLETED | OUTPATIENT
Start: 2021-03-16 | End: 2021-03-16

## 2021-03-16 RX ORDER — LIDOCAINE HYDROCHLORIDE 10 MG/ML
4 INJECTION, SOLUTION INFILTRATION; PERINEURAL ONCE
Status: COMPLETED | OUTPATIENT
Start: 2021-03-16 | End: 2021-03-16

## 2021-03-16 RX ADMIN — LIDOCAINE HYDROCHLORIDE 4 ML: 10 INJECTION, SOLUTION INFILTRATION; PERINEURAL at 15:40

## 2021-03-16 RX ADMIN — METHYLPREDNISOLONE ACETATE 40 MG: 40 INJECTION, SUSPENSION INTRA-ARTICULAR; INTRALESIONAL; INTRAMUSCULAR; SOFT TISSUE at 15:41

## 2021-03-16 RX ADMIN — METHYLPREDNISOLONE ACETATE 40 MG: 40 INJECTION, SUSPENSION INTRA-ARTICULAR; INTRALESIONAL; INTRAMUSCULAR; SOFT TISSUE at 15:40

## 2021-03-16 ASSESSMENT — ENCOUNTER SYMPTOMS
COUGH: 0
VOMITING: 0
ABDOMINAL DISTENTION: 0
ABDOMINAL PAIN: 0
NAUSEA: 0
RESPIRATORY NEGATIVE: 1
CONSTIPATION: 0
SHORTNESS OF BREATH: 0
CHEST TIGHTNESS: 0
DIARRHEA: 0
COLOR CHANGE: 0
APNEA: 0

## 2021-03-16 NOTE — PROGRESS NOTES
[facility]  [address]  [address] [id number]  Dept: [phone]  Dept Fax: [phone]        Right Shoulder/Right knee - Established patient over 5 years ago    Chief Complaint:     Chief Complaint   Patient presents with    Shoulder Pain     Right shoulder pain    Knee Pain     Right knee pain     HPI:     Augusto Catalan is a 50y.o. year old right hand dominant male that has had pain in the right shoulder for years. 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 As far as any trauma to the shoulder, the patient indicates no trauma. He did feel a pop in his shoulder about a week ago while pitching and hurt for a couple of days. The pain is worse at night and when doing overhead activities. Weakness of the shoulder has been noted. The pain restricts activities such as reaching and lifting, throwing a softball. The pain does not seem to improve with time. The following medications have been tried ibuprofen, Tens unit, heat, ice, rest and home exercises. The patient has had a corticosteroid injection on 1/18/2016. The patient has tried physical therapy. The patient has not has surgery. The opposite shoulder is  okay. Neck pain has been present. He has had an MRI of his right shoulder. He is also having right knee pain. He had cortisone injection into his knee on 1/18/2016 with good relief for a while. He had a knee surgery in 2009 with a debridement and meniscectomy. He was told he had a torn ACL but it was not repaired. He describes his pain a jabbing pain at times and achy pain at times. He takes Advil for the pain that helps. He is not experiencing any catching or locking. It is painful to get up from sitting and going up and downstairs. Review of Systems   Constitutional: Positive for activity change. Negative for appetite change, fatigue and fever. Respiratory: Negative.   Negative for apnea, cough, chest tightness and shortness of breath. Cardiovascular: Negative. Negative for chest pain, palpitations and leg swelling. Gastrointestinal: Negative for abdominal distention, abdominal pain, constipation, diarrhea, nausea and vomiting. Genitourinary: Negative for difficulty urinating, dysuria and hematuria. Musculoskeletal: Positive for gait problem and joint swelling. Negative for arthralgias and myalgias. Skin: Negative for color change and rash. Neurological: Positive for numbness. Negative for dizziness, weakness and headaches. Psychiatric/Behavioral: Positive for sleep disturbance. Past Medical History:    Past Medical History:   Diagnosis Date    Depression     /obsessive-compulsive disorder, Dr. Shayne Franklin.  Gastric bypass status for obesity 9/6/13    preop wt 338.2lb    GERD (gastroesophageal reflux disease)     1) EGD 06/08 with minimal esophagitis, not confirmed with biopsy.  Hyperlipidemia     2% risk over 10 years on calculator December 2014    Hypertension     Hypogonadism male     1) Erectile dysfunction. 2) Gynecomastia. 3) Dr. Mignon Mcgee, endocrinology, [facility] evaluation 07/08. 4) MRI of head 08/06.  Impaired fasting glucose     Obesity     BMI 45, 06/08., Bariatric surg 9/13    Obstructive sleep apnea     11/08, CPAP 10.- noncompliant 2016    Psoriasis     Treated with topical steroids Dr Selin Duvall   Betamethasone cream and lotion    Sensorineural hearing loss     1) Dr. Anika Taylor evaluation, 08/06. MRI of head negative 08/06.  Thyroid nodule     right sided, fine needle aspiration 01/09 by Dr. Mignon Mcgee negative. 1) Repeat ultrasound 06/10 stable  Dr. Mignon Mcgee. 2) Fine needle aspiration repeat 10/11, negative.  Vitamin D deficiency     (Dr. Mignon Mcgee).        Past Surgical History:    Past Surgical History:   Procedure Laterality Date    KNEE SURGERY  1994, 2009    right acl tear    ATUL-EN-Y GASTRIC BYPASS  09/18/2013    laproscopic    UPPER GASTROINTESTINAL ENDOSCOPY  2008    WISDOM TOOTH EXTRACTION  1997       CurrentMedications:   Current Outpatient Medications   Medication Sig Dispense Refill    EPINEPHrine (EPIPEN 2-PRESTON) 0.3 MG/0.3ML SOAJ injection Use as directed for allergic reaction      lisinopril (PRINIVIL;ZESTRIL) 10 MG tablet TAKE 1 TABLET BY MOUTH ONE TIME DAILY 30 tablet 5    DULoxetine (CYMBALTA) 60 MG extended release capsule Take 1 capsule by mouth daily 90 capsule 3    EPINEPHrine (EPIPEN JR 2-PRESTON) 0.15 MG/0.3ML SOAJ Use as directed for allergic reaction 2 Device 3    valACYclovir (VALTREX) 1 g tablet TAKE 1 TABLET BY MOUTH ONE TIME A DAY 45 tablet 11    Multiple Vitamin (MVI, CELEBRATE, CHEWABLE TABLET) Take 1 tablet by mouth 2 times daily.  CALCIUM CITRATE Take 1,500 mg by mouth daily Indications: bariatric adv        No current facility-administered medications for this visit. Allergies:    Effexor [venlafaxine], Shellfish-derived products, and Morphine    Social History:   Social History     Socioeconomic History    Marital status:      Spouse name: Not on file    Number of children: Not on file    Years of education: Not on file    Highest education level: Not on file   Occupational History    Not on file   Social Needs    Financial resource strain: Not on file    Food insecurity     Worry: Not on file     Inability: Not on file   [employer] needs     Medical: Not on file     Non-medical: Not on file   Tobacco Use    Smoking status: Never Smoker    Smokeless tobacco: Never Used   Substance and Sexual Activity    Alcohol use: No     Comment: occassional, pt willing to avoid for at least 6 month post bariatric surgery    Drug use: No    Sexual activity: Not on file     Comment: Lives in [place]. Is a teacher. .     Lifestyle    Physical activity     Days per week: Not on file     Minutes per session: Not on file    Stress: Not on file   Relationships    Social connections     Talks on phone: Not on file     Gets together: Not on file     Attends Advent service: Not on file     Active member of club or organization: Not on file     Attends meetings of clubs or organizations: Not on file     Relationship status: Not on file    Intimate partner violence     Fear of current or ex partner: Not on file     Emotionally abused: Not on file     Physically abused: Not on file     Forced sexual activity: Not on file   Other Topics Concern    Not on file   Social History Narrative    Not on file       Family History:  Family History   Problem Relation Age of Onset    Arthritis Mother     Depression Mother     Heart Disease Mother     High Blood Pressure Mother     High Cholesterol Mother     Diabetes Mother     Other Mother         stent placed at age 79    Thyroid Disease Mother     Arthritis Maternal Grandmother     Heart Disease Maternal Grandfather     High Blood Pressure Maternal Grandfather     Cancer Sister         breast    Asthma Brother     Diabetes Maternal Aunt     High Cholesterol Maternal Aunt     High Blood Pressure Maternal Uncle     High Cholesterol Maternal Uncle     Hearing Loss Paternal Grandfather     Glaucoma Other        I have reviewed the CC, HPI, ROS, PMH, FHX, Social History, and if not present in this note, I have reviewed in the patient's chart. I agree with the documentation provided by other staff and have reviewed their documentation prior to providing my signature indicating agreement. Vitals:   BP (!) 140/91   Pulse 74   Ht 6' (1.829 m)   Wt 273 lb (123.8 kg)   BMI 37.03 kg/m²  Body mass index is 37.03 kg/m². Physical Examination:     Orthopedics:    GENERAL: Alert and oriented X3 in no acute distress. SKIN: Intact without lesions or ulcerations. NEURO: Musculoskeletal and axillary nerves intact to sensory and motor testing. VASC: Capillary refill is less than 3 seconds.     Right Shoulder Exam    GEN: Alert and oriented X 3, in no acute distress. SKIN: Intact without rashes, lesions, or ulcerations. NEURO: Musculoskeletal ans axillary nerves intact to sensory and motor testing. VASC: Cap refill less than than 3 secs. Negative Adson's test, Negative Jessica's test.  ROM: 170 degrees of forward elevation, 90 degrees of external rotation in neutral, 90 degrees of external rotation in abduction, internal rotation to L1.    STRENGTH: Supraspinatus 5/5, external rotators 5/5. MUSC: No atrophy, negative subscap lift off, + belly press test.  IMP: + Neer's sign, + Hawkin's sign, negative Coracoid impingement, no painful arc, no pain with cross body abduction. PALP: no pain over anterolateral acromion, + pain over AC joint, no pain over traps/rhomboids. INST: + Utah's test, Negative Speed's test      Knee Exam    LOCATION:  Right Knee  GEN: Alert and oriented X 3, in no acute distress. GAIT: The patient's gait was observed while entering the exam room and was noted to be antalgic. The extremity is in anatomic alignment. SKIN: Intact without rashes, lesions, or ulcerations. No obvious deformity or swelling. NEURO: The patient responds to light touch throughout bilateral LE. Patellar and Achilles reflexes are 2/4. VASC: The bilateral LE is neurovascularly intact with 2/4 DP and 2/4 PT pulses. Brisk capillary refill. ROM: 0/132 degrees. There is mild effusion. MUSC: good quad tone  LIGAMENT: Lachman's test is Positive with Fair endpoint. Anterior drawer Negative. Posterior drawer Negative. There is  No varus instability at 0 degrees and No varus instability at 30 degrees. There is No valgus instability at 0 degrees and No valgus instability at 30 degrees. SPECIAL: Kathi test is negative with no clunks, + crepitation, and no pain. PALP: There is medial joint line and medial tibia pain. Assessment:     1. Degenerative tear of glenoid labrum of right shoulder    2.  Primary osteoarthritis of right knee      Procedures:    Procedure: yes    Regular Knee Injection    Location: Right knee  Procedure: Corticosteroid injection into the knee. the patient was placed in the supine position on the exam table. The superior lateral portal was identified and marked. the skin was prepped with betadine in a sterile fashion. Utilizing [employer] ultrasound unit with a variable frequency linear transducer was used for precise placement and clean technique with sterile gloves a 5cc solution containing 4cc of 1.0% Lidocaine with 1cc containing 40mg of Depo-medrol was injected. There was no resistance to the injection. The wound was cleansed and a band-aid was placed. the patient tolerated the procedure without difficulty. Adverse reactions to the injection were discussed with the patient including signs of infection (increasing pain, redness, swelling) and the patient was instructed to call immediately if experiencing any of these symptoms. Glenohumeral Joint Injection    Location: right Shoulder  Procedure: Corticosteroid injection into the right shoulder. The patient was placed in the  Side Lying position on the exam table. The posterior soft spot approximately 2 cm distal and 2 cm medial to the posterior acromial edge was identified and marked. The skin was prepped with betadine in a sterile fashion. Utilizing GE ultrasound unit with a variable frequency linear transducer for precise placement and clean technique with sterile gloves, a 5 cc solution containing 4 cc of 1 % Lidocaine with 1 cc containing 40mg of Depo medrol was injected into the glenohumeral joint. There was no resistance to injection. The wound was cleansed and a Band-Aid was placed. The patient tolerated the procedure without difficulty. Adverse reactions of the injection was discussed with the patient including signs of infection (increasing pain, redness, swelling) and the patient was instructed to call immediately with any of these symptoms.       Radiology:   SHOULDER X-RAY    Two views of the right shoulder and 2 views of the scapula, including AP, scapular Y, outlet and axillary views reveal: The glenohumeral joint is well reduced no arthritic changes. Proximal migration of the humeral head has not occurred. Acromion is a type II. The acromioclavicular joint shows moderate degenerative changes. Spurring of the greater tubercle noted on the axillary view    Impression:. Mild degenerative changes of the right shoulder     KNEE X-RAY    4 views of the right knee including AP, bilateral tunnel, and lateral in the upright position, and skyline views reveal anatomic alignment with no fracture or dislocation. Kellgren grade III changes of osteoarthritis (joint space narrowing, osteophyte, subchondral sclerosis, bony deformity/cyst) of the medial compartment(s). No osseous loose bodies. No bony erosion or periosteal reaction. No soft tissue masses. Soft tissue calcification of the patellar tendon vs. Loose body. Impression: Moderate osteoarthritis of the right knee. Plan:   Treatment : I reviewed the X-ray and MRI from 2016 with the patient and I informed them that his MRI back in 2016 showed some degenerative changes of his labrum. We discussed the etiologies and natural histories of degenerative labral tear of his right shoulder. He also has moderate osteoarthritis of his right knee most likely from his ACL that was not fixed when he had surgery. We discussed the various treatment alternatives including anti-inflammatory medications, physical therapy, injections, further imaging studies and as a last result surgery. During today's visit, we discussed that I believe the shoulder has some degenerative changes that were on his MRI in 2016. His last shot worked for short time but was not as helpful as the knee shot. I think this might be coming from inside his shoulder joint and I would like to do a glenohumeral injection today to see if that helps better than the subacromial injection. Medications    lidocaine 1 % injection 4 mL    methylPREDNISolone acetate (DEPO-MEDROL) injection 40 mg    lidocaine 1 % injection 4 mL    methylPREDNISolone acetate (DEPO-MEDROL) injection 40 mg       No orders of the defined types were placed in this encounter. Edith Martell PA-C, have personally seen this patient, reviewed the chart including history, and imaging if done. I personally  performed the physical exam and obtained any needed additional history. I placed orders, performed or supervised procedures and developed the treatment plan.     Electronically signed by Jackelin Coleman PA-C, on 3/16/2021 at 4:57 PM      Electronically signed by Jackelin Coleman PA-C, on 3/16/2021 at 4:57 PM

## 2021-03-16 NOTE — PATIENT INSTRUCTIONS
Patient Education        Shoulder Arthritis: Exercises  Introduction  Here are some examples of exercises for you to try. The exercises may be suggested for a condition or for rehabilitation. Start each exercise slowly. Ease off the exercises if you start to have pain. You will be told when to start these exercises and which ones will work best for you. How to do the exercises  Shoulder flexion (lying down)   To make a wand for this exercise, use a piece of PVC pipe or a broom handle with the broom removed. Make the wand about a foot wider than your shoulders. 1. Lie on your back, holding a wand with both hands. Your palms should face down as you hold the wand. 2. Keeping your elbows straight, slowly raise your arms over your head. Raise them until you feel a stretch in your shoulders, upper back, and chest.  3. Hold for 15 to 30 seconds. 4. Repeat 2 to 4 times. Shoulder rotation (lying down)   To make a wand for this exercise, use a piece of PVC pipe or a broom handle with the broom removed. Make the wand about a foot wider than your shoulders. 1. Lie on your back. Hold a wand with both hands with your elbows bent and palms up. 2. Keep your elbows close to your body, and move the wand across your body toward the sore arm. 3. Hold for 8 to 12 seconds. 4. Repeat 2 to 4 times. Shoulder internal rotation with towel   1. Hold a towel above and behind your head with the arm that is not sore. 2. With your sore arm, reach behind your back and grasp the towel. 3. With the arm above your head, pull the towel upward. Do this until you feel a stretch on the front and outside of your sore shoulder. 4. Hold 15 to 30 seconds. 5. Repeat 2 to 4 times. Shoulder blade squeeze   1. Stand with your arms at your sides, and squeeze your shoulder blades together. Do not raise your shoulders up as you squeeze. 2. Hold 6 seconds. 3. Repeat 8 to 12 times.     Resisted rows   For this exercise, you will need elastic exercise material, such as surgical tubing or Thera-Band. 1. Put the band around a solid object at about waist level. (A bedpost will work well.) Each hand should hold an end of the band. 2. With your elbows at your sides and bent to 90 degrees, pull the band back. Your shoulder blades should move toward each other. Return to the starting position. 3. Repeat 8 to 12 times. External rotator strengthening exercise   1. Start by tying a piece of elastic exercise material to a doorknob. You can use surgical tubing or Thera-Band. (You may also hold one end of the band in each hand.)  2. Stand or sit with your shoulder relaxed and your elbow bent 90 degrees. Your upper arm should rest comfortably against your side. Squeeze a rolled towel between your elbow and your body for comfort. This will help keep your arm at your side. 3. Hold one end of the elastic band with the hand of the painful arm. 4. Start with your forearm across your belly. Slowly rotate the forearm out away from your body. Keep your elbow and upper arm tucked against the towel roll or the side of your body until you begin to feel tightness in your shoulder. Slowly move your arm back to where you started. 5. Repeat 8 to 12 times. Internal rotator strengthening exercise   1. Start by tying a piece of elastic exercise material to a doorknob. You can use surgical tubing or Thera-Band. 2. Stand or sit with your shoulder relaxed and your elbow bent 90 degrees. Your upper arm should rest comfortably against your side. Squeeze a rolled towel between your elbow and your body for comfort. This will help keep your arm at your side. 3. Hold one end of the elastic band in the hand of the painful arm. 4. Slowly rotate your forearm toward your body until it touches your belly. Slowly move it back to where you started. 5. Keep your elbow and upper arm firmly tucked against the towel roll or at your side. 6. Repeat 8 to 12 times.     Pendulum swing   If you have pain in your back, do not do this exercise. 1. Hold on to a table or the back of a chair with your good arm. Then bend forward a little and let your sore arm hang straight down. This exercise does not use the arm muscles. Rather, use your legs and your hips to create movement that makes your arm swing freely. 2. Use the movement from your hips and legs to guide the slightly swinging arm back and forth like a pendulum (or elephant trunk). Then guide it in circles that start small (about the size of a dinner plate). Make the circles a bit larger each day, as your pain allows. 3. Do this exercise for 5 minutes, 5 to 7 times each day. 4. As you have less pain, try bending over a little farther to do this exercise. This will increase the amount of movement at your shoulder. Follow-up care is a key part of your treatment and safety. Be sure to make and go to all appointments, and call your doctor if you are having problems. It's also a good idea to know your test results and keep a list of the medicines you take. Where can you learn more? Go to https://[employer]. org and sign in to your [employer] account. Enter H562 in the Prepmatic box to learn more about \"Shoulder Arthritis: Exercises. \"     If you do not have an account, please click on the \"Sign Up Now\" link. Current as of: November 16, 2020               Content Version: 12.8  © 3356-0843 Healthwise, Incorporated. Care instructions adapted under license by [facility] [place] ([facility]). If you have questions about a medical condition or this instruction, always ask your healthcare professional. [place] [address] any warranty or liability for your use of this information.

## 2021-08-09 ENCOUNTER — OFFICE VISIT (OUTPATIENT)
Dept: INTERNAL MEDICINE | Age: 46
End: 2021-08-09
Payer: COMMERCIAL

## 2021-08-09 VITALS
DIASTOLIC BLOOD PRESSURE: 98 MMHG | RESPIRATION RATE: 16 BRPM | SYSTOLIC BLOOD PRESSURE: 132 MMHG | HEART RATE: 70 BPM | WEIGHT: 263.4 LBS | HEIGHT: 72 IN | TEMPERATURE: 96.8 F | BODY MASS INDEX: 35.68 KG/M2

## 2021-08-09 DIAGNOSIS — R73.01 IMPAIRED FASTING GLUCOSE: ICD-10-CM

## 2021-08-09 DIAGNOSIS — I10 HYPERTENSION, UNSPECIFIED TYPE: Primary | ICD-10-CM

## 2021-08-09 DIAGNOSIS — F32.9 MAJOR DEPRESSION, CHRONIC: ICD-10-CM

## 2021-08-09 DIAGNOSIS — E78.2 MIXED HYPERLIPIDEMIA: ICD-10-CM

## 2021-08-09 PROCEDURE — 99214 OFFICE O/P EST MOD 30 MIN: CPT | Performed by: INTERNAL MEDICINE

## 2021-08-09 RX ORDER — LISINOPRIL 10 MG/1
20 TABLET ORAL DAILY
Qty: 30 TABLET | Refills: 5 | Status: SHIPPED | OUTPATIENT
Start: 2021-08-09 | End: 2021-08-09

## 2021-08-09 RX ORDER — LISINOPRIL 20 MG/1
20 TABLET ORAL DAILY
Qty: 30 TABLET | Refills: 5 | Status: SHIPPED | OUTPATIENT
Start: 2021-08-09 | End: 2022-02-10 | Stop reason: SDUPTHER

## 2021-08-09 NOTE — PROGRESS NOTES
[facility] INTERNAL MEDICINE    Visit Date:  8/9/2021  Patient:  Kurt Aguilera  YOB: 1975    Assessment & Plan     Hypertension: Blood pressure elevated today, and has been slightly elevated for the last couple of visits. Will increase lisinopril to 20 mg daily. Reassess next visit. Hyperlipidemia: We will continue to monitor. Depression: We will consider tapering off Cymbalta if he is okay with it next time. Continue it for now    Impaired fasting glucose: We will continue to monitor. Diagnosis Orders   1. Hypertension, unspecified type  CBC Auto Differential    Comprehensive Metabolic Panel   2. Mixed hyperlipidemia  Lipid Panel   3. Impaired fasting glucose  Hemoglobin A1C   4. Major depression, chronic         Chief Complaint  Hypertension (6mo), Hyperlipidemia, and Blood Sugar Problem    History of Present Illness   Presents to follow-up. He is asymptomatic at this time. His blood pressure slightly elevated today in clinic, and it has been slightly elevated for the last couple visits. Says that he takes his lisinopril every day and rarely forgets or skips it. I advised that we can increase the dose of lisinopril to 20 mg daily. He agrees with that. Has a history of depression and I discussed tapering off the Cymbalta, he has been using it for many years. He is not sure about that, says that his wife thinks that he would benefit from it for his mood and anxiety. Says that he would like to think about that first.  Will readdress next visit. Has a history of impaired fasting glucose and hyperlipidemia that are unchanged. Objective  BP (!) 132/98   Pulse 70   Temp 96.8 °F (36 °C) (Tympanic)   Resp 16   Ht 6' (1.829 m)   Wt 263 lb 6.4 oz (119.5 kg)   BMI 35.72 kg/m²   Constitutional: No acute distress. Sits in chair comfortably  Eyes: Sclerae nonicteric.  No lid lag or proptosis  HENT: External ears normal. No external lesions on the nose  Neck: No gross masses. Trachea visibly midline  Respiratory: Good air entry bilaterally. No wheezing or crackles  Cardiovascular: Normal S1-S2. No murmurs. No lower extremity edema  Gastrointestinal: No visible masses. No visible hernias  Skin: No abnormal rashes. No abnormal masses  Neurologic: Cranial nerves grossly intact  Psychiatric: Normal affect. Alert and oriented    Medications  Current Outpatient Medications:     lisinopril (PRINIVIL;ZESTRIL) 20 MG tablet, Take 1 tablet by mouth daily, Disp: 30 tablet, Rfl: 5    EPINEPHrine (EPIPEN 2-PRESTON) 0.3 MG/0.3ML SOAJ injection, Use as directed for allergic reaction, Disp:  , Rfl:     DULoxetine (CYMBALTA) 60 MG extended release capsule, Take 1 capsule by mouth daily, Disp: 90 capsule, Rfl: 3    EPINEPHrine (EPIPEN JR 2-PRESTON) 0.15 MG/0.3ML SOAJ, Use as directed for allergic reaction, Disp: 2 Device, Rfl: 3    valACYclovir (VALTREX) 1 g tablet, TAKE 1 TABLET BY MOUTH ONE TIME A DAY, Disp: 45 tablet, Rfl: 11    Multiple Vitamin (MVI, CELEBRATE, CHEWABLE TABLET), Take 1 tablet by mouth 2 times daily. , Disp: , Rfl:     Allergies  Effexor [venlafaxine], Shellfish-derived products, and Morphine    Past Medical History:   Diagnosis Date    Depression     /obsessive-compulsive disorder, Dr. Wei Triplett.  Gastric bypass status for obesity 9/6/13    preop wt 338.2lb    GERD (gastroesophageal reflux disease)     1) EGD 06/08 with minimal esophagitis, not confirmed with biopsy.  Hyperlipidemia     2% risk over 10 years on calculator December 2014    Hypertension     Hypogonadism male     1) Erectile dysfunction. 2) Gynecomastia. 3) Dr. Fatou Cedillo, endocrinology, [facility] evaluation 07/08. 4) MRI of head 08/06.      Impaired fasting glucose     Obesity     BMI 45, 06/08., Bariatric surg 9/13    Obstructive sleep apnea     11/08, CPAP 10.- noncompliant 2016    Psoriasis     Treated with topical steroids Dr Koko Hoover   Betamethasone cream and lotion    Sensorineural hearing

## 2021-12-14 RX ORDER — VALACYCLOVIR HYDROCHLORIDE 1 G/1
TABLET, FILM COATED ORAL
Qty: 90 TABLET | Refills: 3 | Status: SHIPPED | OUTPATIENT
Start: 2021-12-14 | End: 2022-06-06 | Stop reason: SDUPTHER

## 2022-01-04 ENCOUNTER — OFFICE VISIT (OUTPATIENT)
Dept: OTOLARYNGOLOGY | Age: 47
End: 2022-01-04
Payer: COMMERCIAL

## 2022-01-04 VITALS
OXYGEN SATURATION: 97 % | BODY MASS INDEX: 38.09 KG/M2 | HEART RATE: 88 BPM | HEIGHT: 72 IN | WEIGHT: 281.2 LBS | DIASTOLIC BLOOD PRESSURE: 84 MMHG | SYSTOLIC BLOOD PRESSURE: 134 MMHG

## 2022-01-04 DIAGNOSIS — H90.A32 MIXED CONDUCTIVE AND SENSORINEURAL HEARING LOSS OF LEFT EAR WITH RESTRICTED HEARING OF RIGHT EAR: Primary | ICD-10-CM

## 2022-01-04 DIAGNOSIS — H93.13 TINNITUS OF BOTH EARS: ICD-10-CM

## 2022-01-04 DIAGNOSIS — H90.A21 SENSORINEURAL HEARING LOSS (SNHL) OF RIGHT EAR WITH RESTRICTED HEARING OF LEFT EAR: ICD-10-CM

## 2022-01-04 DIAGNOSIS — H61.23 BILATERAL IMPACTED CERUMEN: ICD-10-CM

## 2022-01-04 PROCEDURE — 69210 REMOVE IMPACTED EAR WAX UNI: CPT | Performed by: OTOLARYNGOLOGY

## 2022-01-04 PROCEDURE — 99213 OFFICE O/P EST LOW 20 MIN: CPT | Performed by: OTOLARYNGOLOGY

## 2022-01-04 NOTE — PROGRESS NOTES
1/4/2022 9:51 AM EDT    Chief Complaint:   Chief Complaint   Patient presents with    Ear Problem     hearing lost in Rt ear and cerumen in scooby       The patient is a 48y.o.  year old  male  who presents with a complaint of right hearing loss with onset 3-4 weeks ago. He and his family were diagnosed with covid during that same time and were in isolation. About 1 week into isolation he developed sudden worsening of his right hearing which used to be his \"good\" ear. It has improved since it started but has been stable for about 2-3 weeks. Had audio several years ago that showed right sided hearing loss. He declined an aid at that time. 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 Some tinnitus bilateral that started several weeks ago as well. Prone to motion sickness. When he stands up he gets a dizzy sensation for 5-10 seconds. Feels like passing out but hasn't. Sits down and it passes. Has been told it may be related to BP/dehydration. Today Jolene Desai states that the hearing is baseline. He has known left-sided hearing loss. States that he has difficulty reading lips with the masks. His wife states that he listens to the TV loudly. He does not notice significant quality of life impact from any hearing loss. No concerns with sound localization. He is not interested in hearing aids at this time. Audio 12/23/20  Right hearing mild to moderate sensorineural hearing loss at 1.5 to 6 kHz  Left hearing essentially moderate mixed hearing loss  Conductive component on the left at 250 500 1000hz. Type A tympanometry bilateral  Word recognition score 96% bilateral    There was no specific precipitating event. There are no aggravating or mitigating factors.     Social History     Substance and Sexual Activity   Alcohol Use No    Comment: occassional, pt willing to avoid for at least 6 month post bariatric surgery     Social History     Tobacco Use   Smoking Status Never Smoker   Smokeless Tobacco Never Used     Drug Sensitivities: Effexor [venlafaxine], Shellfish-derived products, and Morphine  Medications:  Outpatient Medications Prior to Visit   Medication Sig Dispense Refill    valACYclovir (VALTREX) 1 g tablet TAKE 1 TABLET BY MOUTH ONE TIME DAILY 90 tablet 3    lisinopril (PRINIVIL;ZESTRIL) 20 MG tablet Take 1 tablet by mouth daily 30 tablet 5    DULoxetine (CYMBALTA) 60 MG extended release capsule Take 1 capsule by mouth daily 90 capsule 3    Multiple Vitamin (MVI, CELEBRATE, CHEWABLE TABLET) Take 1 tablet by mouth 2 times daily.  EPINEPHrine (EPIPEN 2-PRESTON) 0.3 MG/0.3ML SOAJ injection Use as directed for allergic reaction (Patient not taking: Reported on 1/4/2022)      EPINEPHrine (Franceen Killian 2-PRESTON) 0.15 MG/0.3ML SOAJ Use as directed for allergic reaction (Patient not taking: Reported on 1/4/2022) 2 Device 3     No facility-administered medications prior to visit. Past Medical History:   Diagnosis Date    Depression     /obsessive-compulsive disorder, Dr. Jayy Byrd.  Gastric bypass status for obesity 9/6/13    preop wt 338.2lb    GERD (gastroesophageal reflux disease)     1) EGD 06/08 with minimal esophagitis, not confirmed with biopsy.  Hyperlipidemia     2% risk over 10 years on calculator December 2014    Hypertension     Hypogonadism male     1) Erectile dysfunction. 2) Gynecomastia. 3) Dr. Corinna Alvarez, endocrinology, [facility] evaluation 07/08. 4) MRI of head 08/06.  Impaired fasting glucose     Obesity     BMI 45, 06/08., Bariatric surg 9/13    Obstructive sleep apnea     11/08, CPAP 10.- noncompliant 2016    Psoriasis     Treated with topical steroids Dr Eugene Angeles   Betamethasone cream and lotion    Sensorineural hearing loss     1) Dr. Yessy Ag evaluation, 08/06. MRI of head negative 08/06.  Thyroid nodule     right sided, fine needle aspiration 01/09 by Dr. Corinna Alvarez negative. 1) Repeat ultrasound 06/10 stable  Dr. Corinna Alvarez.  2) Fine needle aspiration repeat 10/11, negative.  Vitamin D deficiency     (Dr. Antoinette Castillo). Past Surgical History:   Procedure Laterality Date    KNEE SURGERY  1994, 2009    right acl tear    ATUL-EN-Y GASTRIC BYPASS  09/18/2013    laproscopic    UPPER GASTROINTESTINAL ENDOSCOPY  2008    WISDOM TOOTH EXTRACTION  1997     Family History   Problem Relation Age of Onset    Arthritis Mother     Depression Mother     Heart Disease Mother     High Blood Pressure Mother     High Cholesterol Mother     Diabetes Mother     Other Mother         stent placed at age 79    Thyroid Disease Mother     Arthritis Maternal Grandmother     Heart Disease Maternal Grandfather     High Blood Pressure Maternal Grandfather     Cancer Sister         breast    Asthma Brother     Diabetes Maternal Aunt     High Cholesterol Maternal Aunt     High Blood Pressure Maternal Uncle     High Cholesterol Maternal Uncle     Hearing Loss Paternal Grandfather     Glaucoma Other      ROS:   Blood pressure 134/84, pulse 88, height 6' (1.829 m), weight 281 lb 3.2 oz (127.6 kg), SpO2 97 %. General: The patient is found to be alert and normally responsive male with grossly normal hearing, clear voice and normal articulation. Communication is without difficulty. Voice: Clear   Skin: The skin has normal colour and turgor. Face: The facial contour is symmetric at rest and with movement. Ears: The pinnae have normal contours. AD: EAC with cerumen, TM intact, no effusion/erythema/retraction   AS: EAC with cerumen, TM intact, no effusion/erythema/retraction   AS: Cerumen removed with alligator   AD: Cerumen removed with alligator  Eye: The ocular movements are full and symmetric, the conjunctiva is unremarkable; sclera are anicteric, pupillary response is symmetric. No nystagmus is found. Nose:   The external nasal contour is normal    IMP:    1. Mixed conductive and sensorineural hearing loss of left ear with restricted hearing of right ear    2. Sensorineural hearing loss (SNHL) of right ear with restricted hearing of left ear    3. Tinnitus of both ears    4. Bilateral impacted cerumen         Plan:     Asymmetric audiogram with worse hearing on the left side however asymmetry is mostly attributed to a conductive component with essentially symmetrical bone lines. No evidence of effusion or infection. Bilateral ears in the high frequencies demonstrate a cookie bite pattern   Cochlear otosclerosis can be consistent with a cookie bite pattern with a mixed hearing loss pattern. Cookie bite hearing loss can also be associated with hereditary hearing loss which is at the top of the differential given his young age. Discussed options for rehab including referral to otology for middle ear exploration consultation, hearing aid, VNG balance test, and observation. He states that he will discuss these options with his wife. He is medically cleared for a hearing aid at this time. He is leaning towards observation. Recommend repeat audiogram.  Recommend sooner follow-up if dizziness or hearing loss progresses. Fu in 1 year for ear cleaning, sxs check   Will call with results of audiogram     Orders: No orders of the defined types were placed in this encounter. Rx:   No orders of the defined types were placed in this encounter.

## 2022-01-26 ENCOUNTER — HOSPITAL ENCOUNTER (OUTPATIENT)
Dept: LAB | Age: 47
Discharge: HOME | End: 2022-01-26
Payer: COMMERCIAL

## 2022-01-26 DIAGNOSIS — R73.01 IMPAIRED FASTING GLUCOSE: ICD-10-CM

## 2022-01-26 DIAGNOSIS — E78.2 MIXED HYPERLIPIDEMIA: ICD-10-CM

## 2022-01-26 DIAGNOSIS — I10 HYPERTENSION, UNSPECIFIED TYPE: ICD-10-CM

## 2022-01-26 LAB
ABSOLUTE EOS #: 0.09 K/UL (ref 0–0.44)
ABSOLUTE IMMATURE GRANULOCYTE: <0.03 K/UL (ref 0–0.3)
ABSOLUTE LYMPH #: 1.88 K/UL (ref 1.1–3.7)
ABSOLUTE MONO #: 0.4 K/UL (ref 0.1–1.2)
ALBUMIN SERPL-MCNC: 4.6 G/DL (ref 3.5–5.2)
ALBUMIN/GLOBULIN RATIO: 1.8 (ref 1–2.5)
ALP BLD-CCNC: 86 U/L (ref 40–129)
ALT SERPL-CCNC: 44 U/L (ref 5–41)
ANION GAP SERPL CALCULATED.3IONS-SCNC: 8 MMOL/L (ref 9–17)
AST SERPL-CCNC: 30 U/L
BASOPHILS # BLD: 1 % (ref 0–2)
BASOPHILS ABSOLUTE: 0.03 K/UL (ref 0–0.2)
BILIRUB SERPL-MCNC: 0.45 MG/DL (ref 0.3–1.2)
BUN BLDV-MCNC: 19 MG/DL (ref 6–20)
BUN/CREAT BLD: 20 (ref 9–20)
CALCIUM SERPL-MCNC: 10 MG/DL (ref 8.6–10.4)
CHLORIDE BLD-SCNC: 103 MMOL/L (ref 98–107)
CHOLESTEROL/HDL RATIO: 5.9
CHOLESTEROL: 258 MG/DL
CO2: 30 MMOL/L (ref 20–31)
CREAT SERPL-MCNC: 0.94 MG/DL (ref 0.7–1.2)
DIFFERENTIAL TYPE: ABNORMAL
EOSINOPHILS RELATIVE PERCENT: 2 % (ref 1–4)
ESTIMATED AVERAGE GLUCOSE: 126 MG/DL
GFR AFRICAN AMERICAN: >60 ML/MIN
GFR NON-AFRICAN AMERICAN: >60 ML/MIN
GFR SERPL CREATININE-BSD FRML MDRD: ABNORMAL ML/MIN/{1.73_M2}
GFR SERPL CREATININE-BSD FRML MDRD: ABNORMAL ML/MIN/{1.73_M2}
GLUCOSE BLD-MCNC: 126 MG/DL (ref 70–99)
HBA1C MFR BLD: 6 % (ref 4–6)
HCT VFR BLD CALC: 44.4 % (ref 40.7–50.3)
HDLC SERPL-MCNC: 44 MG/DL
HEMOGLOBIN: 15 G/DL (ref 13–17)
IMMATURE GRANULOCYTES: 0 %
LDL CHOLESTEROL: 183 MG/DL (ref 0–130)
LYMPHOCYTES # BLD: 37 % (ref 24–43)
MCH RBC QN AUTO: 30.9 PG (ref 25.2–33.5)
MCHC RBC AUTO-ENTMCNC: 33.8 G/DL (ref 25.2–33.5)
MCV RBC AUTO: 91.5 FL (ref 82.6–102.9)
MONOCYTES # BLD: 8 % (ref 3–12)
NRBC AUTOMATED: 0 PER 100 WBC
PDW BLD-RTO: 12.9 % (ref 11.8–14.4)
PLATELET # BLD: 203 K/UL (ref 138–453)
PLATELET ESTIMATE: ABNORMAL
PMV BLD AUTO: 9.8 FL (ref 8.1–13.5)
POTASSIUM SERPL-SCNC: 5 MMOL/L (ref 3.7–5.3)
RBC # BLD: 4.85 M/UL (ref 4.21–5.77)
RBC # BLD: ABNORMAL 10*6/UL
SEG NEUTROPHILS: 52 % (ref 36–65)
SEGMENTED NEUTROPHILS ABSOLUTE COUNT: 2.69 K/UL (ref 1.5–8.1)
SODIUM BLD-SCNC: 141 MMOL/L (ref 135–144)
TOTAL PROTEIN: 7.2 G/DL (ref 6.4–8.3)
TRIGL SERPL-MCNC: 157 MG/DL
VLDLC SERPL CALC-MCNC: ABNORMAL MG/DL (ref 1–30)
WBC # BLD: 5.1 K/UL (ref 3.5–11.3)
WBC # BLD: ABNORMAL 10*3/UL

## 2022-01-26 PROCEDURE — 36415 COLL VENOUS BLD VENIPUNCTURE: CPT

## 2022-01-26 PROCEDURE — 85025 COMPLETE CBC W/AUTO DIFF WBC: CPT

## 2022-01-26 PROCEDURE — 83036 HEMOGLOBIN GLYCOSYLATED A1C: CPT

## 2022-01-26 PROCEDURE — 80061 LIPID PANEL: CPT

## 2022-01-26 PROCEDURE — 80053 COMPREHEN METABOLIC PANEL: CPT

## 2022-02-10 ENCOUNTER — TELEPHONE (OUTPATIENT)
Dept: SURGERY | Age: 47
End: 2022-02-10

## 2022-02-10 ENCOUNTER — OFFICE VISIT (OUTPATIENT)
Dept: INTERNAL MEDICINE | Age: 47
End: 2022-02-10
Payer: COMMERCIAL

## 2022-02-10 VITALS
RESPIRATION RATE: 16 BRPM | OXYGEN SATURATION: 98 % | TEMPERATURE: 97.8 F | SYSTOLIC BLOOD PRESSURE: 120 MMHG | WEIGHT: 280.2 LBS | HEART RATE: 75 BPM | BODY MASS INDEX: 37.95 KG/M2 | DIASTOLIC BLOOD PRESSURE: 85 MMHG | HEIGHT: 72 IN

## 2022-02-10 DIAGNOSIS — E78.2 MIXED HYPERLIPIDEMIA: ICD-10-CM

## 2022-02-10 DIAGNOSIS — F32.9 MAJOR DEPRESSION, CHRONIC: ICD-10-CM

## 2022-02-10 DIAGNOSIS — I10 HYPERTENSION, UNSPECIFIED TYPE: ICD-10-CM

## 2022-02-10 DIAGNOSIS — Z12.11 ENCOUNTER FOR SCREENING FOR MALIGNANT NEOPLASM OF COLON: Primary | ICD-10-CM

## 2022-02-10 DIAGNOSIS — R73.01 IMPAIRED FASTING GLUCOSE: ICD-10-CM

## 2022-02-10 PROCEDURE — 99214 OFFICE O/P EST MOD 30 MIN: CPT | Performed by: INTERNAL MEDICINE

## 2022-02-10 RX ORDER — ATORVASTATIN CALCIUM 10 MG/1
10 TABLET, FILM COATED ORAL DAILY
Qty: 90 TABLET | Refills: 1 | Status: SHIPPED | OUTPATIENT
Start: 2022-02-10 | End: 2022-06-06 | Stop reason: SDUPTHER

## 2022-02-10 RX ORDER — LISINOPRIL 20 MG/1
20 TABLET ORAL DAILY
Qty: 90 TABLET | Refills: 3 | Status: SHIPPED | OUTPATIENT
Start: 2022-02-10 | End: 2022-06-06 | Stop reason: SDUPTHER

## 2022-02-10 RX ORDER — DULOXETIN HYDROCHLORIDE 60 MG/1
60 CAPSULE, DELAYED RELEASE ORAL DAILY
Qty: 90 CAPSULE | Refills: 3 | Status: SHIPPED | OUTPATIENT
Start: 2022-02-10 | End: 2022-08-30 | Stop reason: SDUPTHER

## 2022-02-10 NOTE — PROGRESS NOTES
[facility]    Visit Date:  2/10/2022  Patient:  Fausto Whalen  YOB: 1975    Assessment & Plan     Hyperlipidemia: Quite higher than before. Will order atorvastatin 10 mg daily. Reassess next visit. Hypertension: Blood pressure controlled. Continue lisinopril. Impaired fasting glucose: We will continue to monitor. Depression: Mood appears to be stable. Does not want to cut down on Cymbalta at this time, will readdress next visit    We will order screening colonoscopy. Diagnosis Orders   1. Encounter for screening for malignant neoplasm of colon  [facility], [place]   2. Hypertension, unspecified type     3. Mixed hyperlipidemia  CBC Auto Differential    Comprehensive Metabolic Panel    Lipid Panel   4. Impaired fasting glucose  Hemoglobin A1C       Chief Complaint  Hypertension (6 month follow up), Hyperlipidemia, and Depression    History of Present Illness   Presents to follow-up. Says that he is doing okay at this time. He has no issues. His cholesterol is slightly higher than before, LDL is in the 180s, and I advised that when LDL gets to 190, this typically means that he has a genetic component to his hyperlipidemia. I advised that needs to start on a cholesterol medicine. He says that he previously had side effects to cholesterol medications, but is not sure which. I advised that we can try Lipitor and he agrees to that. Has a history of hypertension, hyperlipidemia, depression that are unchanged    Objective  /85 (Site: Left Upper Arm, Position: Sitting)   Pulse 75   Temp 97.8 °F (36.6 °C) (Tympanic)   Resp 16   Ht 6' (1.829 m)   Wt 280 lb 3.2 oz (127.1 kg)   SpO2 98%   BMI 38.00 kg/m²   Constitutional: No acute distress. Sits in chair comfortably  Eyes: Sclerae nonicteric. No lid lag or proptosis  HENT: External ears normal. No external lesions on the nose  Neck: No gross masses.  Trachea visibly midline  Respiratory: Good air entry bilaterally. No wheezing or crackles  Cardiovascular: Normal S1-S2. No murmurs. No lower extremity edema  Gastrointestinal: No visible masses. No visible hernias  Skin: No abnormal rashes. No abnormal masses  Neurologic: Cranial nerves grossly intact  Psychiatric: Normal affect. Alert and oriented    Medications  Current Outpatient Medications:     DULoxetine (CYMBALTA) 60 MG extended release capsule, Take 1 capsule by mouth daily, Disp: 90 capsule, Rfl: 3    lisinopril (PRINIVIL;ZESTRIL) 20 MG tablet, Take 1 tablet by mouth daily, Disp: 90 tablet, Rfl: 3    atorvastatin (LIPITOR) 10 MG tablet, Take 1 tablet by mouth daily, Disp: 90 tablet, Rfl: 1    valACYclovir (VALTREX) 1 g tablet, TAKE 1 TABLET BY MOUTH ONE TIME DAILY, Disp: 90 tablet, Rfl: 3    Multiple Vitamin (MVI, CELEBRATE, CHEWABLE TABLET), Take 1 tablet by mouth 2 times daily. , Disp: , Rfl:     EPINEPHrine (EPIPEN 2-PRESTON) 0.3 MG/0.3ML SOAJ injection, Use as directed for allergic reaction (Patient not taking: Reported on 1/4/2022), Disp:  , Rfl:     EPINEPHrine (Gloria Sylvia 2-PRESTON) 0.15 MG/0.3ML SOAJ, Use as directed for allergic reaction (Patient not taking: Reported on 1/4/2022), Disp: 2 Device, Rfl: 3    Allergies  Effexor [venlafaxine], Shellfish-derived products, and Morphine    Past Medical History:   Diagnosis Date    Depression     /obsessive-compulsive disorder, Dr. Carlotta Grullon.  Gastric bypass status for obesity 9/6/13    preop wt 338.2lb    GERD (gastroesophageal reflux disease)     1) EGD 06/08 with minimal esophagitis, not confirmed with biopsy.  Hyperlipidemia     2% risk over 10 years on calculator December 2014    Hypertension     Hypogonadism male     1) Erectile dysfunction. 2) Gynecomastia. 3) Dr. Casimiro Bro, endocrinology, [facility] evaluation 07/08. 4) MRI of head 08/06.      Impaired fasting glucose     Obesity     BMI 45, 06/08., Bariatric surg 9/13    Obstructive sleep apnea 11/08, CPAP 10.- noncompliant 2016    Psoriasis     Treated with topical steroids Dr Geovanni Maldonado   Betamethasone cream and lotion    Sensorineural hearing loss     1) Dr. Russell Co evaluation, 08/06. MRI of head negative 08/06.  Thyroid nodule     right sided, fine needle aspiration 01/09 by Dr. Aileen Lizama negative. 1) Repeat ultrasound 06/10 stable  Dr. Aileen Lizama. 2) Fine needle aspiration repeat 10/11, negative.  Vitamin D deficiency     (Dr. Aileen Lizama). Past Surgical History:   Procedure Laterality Date    KNEE SURGERY  1994, 2009    right acl tear    ATUL-EN-Y GASTRIC BYPASS  09/18/2013    laproscopic    UPPER GASTROINTESTINAL ENDOSCOPY  2008    WISDOM TOOTH EXTRACTION  1997     Family History  This patient's family history includes Arthritis in his maternal grandmother and mother; Asthma in his brother; Cancer in his sister; Depression in his mother; Diabetes in his maternal aunt and mother; Glaucoma in an other family member; Hearing Loss in his paternal grandfather; Heart Disease in his maternal grandfather and mother; High Blood Pressure in his maternal grandfather, maternal uncle, and mother; High Cholesterol in his maternal aunt, maternal uncle, and mother; Other in his mother; Thyroid Disease in his mother. Social History  Wagner Sharma  reports that he has never smoked. He has never used smokeless tobacco. He reports that he does not drink alcohol and does not use drugs. Discussed use, benefit, and side effects of prescribed medications. All questions answered. Patient voiced understanding. Reviewed health maintenance. Electronically signed Krissy Hollingsworth MD on 2/10/2022 at 5:17 PM    This note has been created using the Epic electronic health record, and dictated in part by [employer] dictation system. Despite the documenting physician's best efforts, there may be errors in spelling, grammar or syntax.

## 2022-03-29 DIAGNOSIS — M17.11 PRIMARY OSTEOARTHRITIS OF RIGHT KNEE: Primary | ICD-10-CM

## 2022-03-30 ENCOUNTER — OFFICE VISIT (OUTPATIENT)
Dept: ORTHOPEDIC SURGERY | Age: 47
End: 2022-03-30
Payer: COMMERCIAL

## 2022-03-30 VITALS
HEART RATE: 88 BPM | HEIGHT: 72 IN | SYSTOLIC BLOOD PRESSURE: 131 MMHG | DIASTOLIC BLOOD PRESSURE: 89 MMHG | BODY MASS INDEX: 36.57 KG/M2 | RESPIRATION RATE: 12 BRPM | WEIGHT: 270 LBS

## 2022-03-30 DIAGNOSIS — M17.11 PRIMARY OSTEOARTHRITIS OF RIGHT KNEE: Primary | ICD-10-CM

## 2022-03-30 PROCEDURE — 20611 DRAIN/INJ JOINT/BURSA W/US: CPT | Performed by: PHYSICIAN ASSISTANT

## 2022-03-30 RX ORDER — METHYLPREDNISOLONE ACETATE 80 MG/ML
80 INJECTION, SUSPENSION INTRA-ARTICULAR; INTRALESIONAL; INTRAMUSCULAR; SOFT TISSUE ONCE
Status: DISCONTINUED | OUTPATIENT
Start: 2022-03-30 | End: 2022-03-30

## 2022-03-30 RX ORDER — METHYLPREDNISOLONE ACETATE 80 MG/ML
80 INJECTION, SUSPENSION INTRA-ARTICULAR; INTRALESIONAL; INTRAMUSCULAR; SOFT TISSUE ONCE
Status: COMPLETED | OUTPATIENT
Start: 2022-03-30 | End: 2022-03-30

## 2022-03-30 RX ORDER — LIDOCAINE HYDROCHLORIDE 10 MG/ML
2 INJECTION, SOLUTION INFILTRATION; PERINEURAL ONCE
Status: DISCONTINUED | OUTPATIENT
Start: 2022-03-30 | End: 2022-03-30

## 2022-03-30 RX ORDER — LIDOCAINE HYDROCHLORIDE 10 MG/ML
2 INJECTION, SOLUTION INFILTRATION; PERINEURAL ONCE
Status: COMPLETED | OUTPATIENT
Start: 2022-03-30 | End: 2022-03-30

## 2022-03-30 RX ADMIN — LIDOCAINE HYDROCHLORIDE 2 ML: 10 INJECTION, SOLUTION INFILTRATION; PERINEURAL at 11:59

## 2022-03-30 RX ADMIN — METHYLPREDNISOLONE ACETATE 80 MG: 80 INJECTION, SUSPENSION INTRA-ARTICULAR; INTRALESIONAL; INTRAMUSCULAR; SOFT TISSUE at 11:59

## 2022-03-30 ASSESSMENT — ENCOUNTER SYMPTOMS
ABDOMINAL DISTENTION: 0
DIARRHEA: 0
ABDOMINAL PAIN: 0
RESPIRATORY NEGATIVE: 1
NAUSEA: 0
APNEA: 0
COUGH: 0
VOMITING: 0
CONSTIPATION: 0
COLOR CHANGE: 0
CHEST TIGHTNESS: 0
SHORTNESS OF BREATH: 0

## 2022-03-30 NOTE — PROGRESS NOTES
[address] AND [facility]TS [facility]ICINE  [address] [place] [id number]  Dept: [phone]  Dept Fax: [phone]          Right Knee - Follow Up     Subjective:     Chief Complaint   Patient presents with    Follow-up     R Knee (LI 3/16/21)     HPI:     Julita Posadas presents today for Right knee pain. The pain has been present for 1 month. He was doing really well after the injection 3/16/2021 until softball season started again. He has began running and cutting activities which have increased his knee pain. He also had a fall on the ice in January 2022. The patient has tried advil with mild improvement. The pain is now described as Achy and Sharp. There is pain on weight bearing. The knee has swelled. There is  painful popping and clicking. The knee has not caught or locked up. The knee has given out. It is  stiff upon arising from sitting. It is  painful to go up and down stairs and sit for a prolonged time. The patient has not tried a lubrication injection. The patient has tried physical therapy. The patient has had surgery. He has had to arthroscopic surgeries on the right knee. He also states that he has a history of a torn ACL on that knee    ROS:   Review of Systems   Constitutional: Positive for activity change. Negative for appetite change, fatigue and fever. Respiratory: Negative. Negative for apnea, cough, chest tightness and shortness of breath. Cardiovascular: Negative. Negative for chest pain, palpitations and leg swelling. Gastrointestinal: Negative for abdominal distention, abdominal pain, constipation, diarrhea, nausea and vomiting. Genitourinary: Negative for difficulty urinating, dysuria and hematuria. Musculoskeletal: Positive for arthralgias, gait problem and joint swelling. Negative for myalgias. Skin: Negative for color change and rash.    Neurological: Negative for dizziness, weakness, numbness and headaches. Psychiatric/Behavioral: Positive for sleep disturbance. Past Medical History:    Past Medical History:   Diagnosis Date    Depression     /obsessive-compulsive disorder, Dr. Atul Trujillo.  Gastric bypass status for obesity 9/6/13    preop wt 338.2lb    GERD (gastroesophageal reflux disease)     1) EGD 06/08 with minimal esophagitis, not confirmed with biopsy.  Hyperlipidemia     2% risk over 10 years on calculator December 2014    Hypertension     Hypogonadism male     1) Erectile dysfunction. 2) Gynecomastia. 3) Dr. Marco López, endocrinology, [facility] evaluation 07/08. 4) MRI of head 08/06.  Impaired fasting glucose     Obesity     BMI 45, 06/08., Bariatric surg 9/13    Obstructive sleep apnea     11/08, CPAP 10.- noncompliant 2016    Psoriasis     Treated with topical steroids Dr Malik Alvarez   Betamethasone cream and lotion    Sensorineural hearing loss     1) Dr. Sukumar Connell evaluation, 08/06. MRI of head negative 08/06.  Thyroid nodule     right sided, fine needle aspiration 01/09 by Dr. Marco López negative. 1) Repeat ultrasound 06/10 stable  Dr. Marco López. 2) Fine needle aspiration repeat 10/11, negative.  Vitamin D deficiency     (Dr. Marco López).        Past Surgical History:    Past Surgical History:   Procedure Laterality Date    KNEE SURGERY  1994, 2009    right acl tear    ATUL-EN-Y GASTRIC BYPASS  09/18/2013    laproscopic    UPPER GASTROINTESTINAL ENDOSCOPY  2008    WISDOM TOOTH EXTRACTION  1997       CurrentMedications:   Current Outpatient Medications   Medication Sig Dispense Refill    DULoxetine (CYMBALTA) 60 MG extended release capsule Take 1 capsule by mouth daily 90 capsule 3    lisinopril (PRINIVIL;ZESTRIL) 20 MG tablet Take 1 tablet by mouth daily 90 tablet 3    atorvastatin (LIPITOR) 10 MG tablet Take 1 tablet by mouth daily 90 tablet 1    valACYclovir (VALTREX) 1 g tablet TAKE 1 TABLET BY MOUTH ONE TIME DAILY 90 tablet 3    EPINEPHrine (EPIPEN 2-PRESTON) 0.3 MG/0.3ML SOAJ injection Use as directed for allergic reaction (Patient not taking: Reported on 1/4/2022)      Multiple Vitamin (MVI, CELEBRATE, CHEWABLE TABLET) Take 1 tablet by mouth 2 times daily. Current Facility-Administered Medications   Medication Dose Route Frequency Provider Last Rate Last Admin    lidocaine 1 % injection 2 mL  2 mL Intra-artICUlar Once Henrietta Garcia PA-C        methylPREDNISolone acetate (DEPO-MEDROL) injection 80 mg  80 mg Intra-artICUlar Once Henrietta Garcia PA-C           Allergies:    Effexor [venlafaxine], Shellfish-derived products, and Morphine    Social History:   Social History     Socioeconomic History    Marital status:      Spouse name: None    Number of children: None    Years of education: None    Highest education level: None   Occupational History    None   Tobacco Use    Smoking status: Never Smoker    Smokeless tobacco: Never Used   Vaping Use    Vaping Use: Never used   Substance and Sexual Activity    Alcohol use: No     Comment: occassional, pt willing to avoid for at least 6 month post bariatric surgery    Drug use: No    Sexual activity: None     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 . Other Topics Concern    None   Social History Narrative    None     Social Determinants of Health     Financial Resource Strain:     Difficulty of Paying Living Expenses: Not on file   Food Insecurity:     Worried About Running Out of Food in the Last Year: Not on file    Kade of Food in the Last Year: Not on file   Transportation Needs:     Lack of Transportation (Medical): Not on file    Lack of Transportation (Non-Medical):  Not on file   Physical Activity:     Days of Exercise per Week: Not on file    Minutes of Exercise per Session: Not on file   Stress:     Feeling of Stress : Not on file   Social Connections:     Frequency of Communication with Friends and Family: Not on file    Frequency of Social Gatherings with Friends and Family: Not on file    Attends Jewish Services: Not on file    Active Member of Clubs or Organizations: Not on file    Attends Club or Organization Meetings: Not on file    Marital Status: Not on file   Intimate Partner Violence:     Fear of Current or Ex-Partner: Not on file    Emotionally Abused: Not on file    Physically Abused: Not on file    Sexually Abused: Not on file   Housing Stability:     Unable to Pay for Housing in the Last Year: Not on file    Number of [place] in the Last Year: Not on file    Unstable Housing in the Last Year: Not on file       Family History:  Family History   Problem Relation Age of Onset    Arthritis Mother     Depression Mother     Heart Disease Mother     High Blood Pressure Mother     High Cholesterol Mother     Diabetes Mother     Other Mother         stent placed at age 79    Thyroid Disease Mother     Arthritis Maternal Grandmother     Heart Disease Maternal Grandfather     High Blood Pressure Maternal Grandfather     Cancer Sister         breast    Asthma Brother     Diabetes Maternal Aunt     High Cholesterol Maternal Aunt     High Blood Pressure Maternal Uncle     High Cholesterol Maternal Uncle     Hearing Loss Paternal Grandfather     Glaucoma Other        Vitals:   /89   Pulse 88   Resp 12   Ht 6' (1.829 m)   Wt 270 lb (122.5 kg)   BMI 36.62 kg/m²  Body mass index is 36.62 kg/m². Physical Examination:     Orthopedics:    GENERAL: Alert and oriented X3 in no acute distress. SKIN: Intact without lesions or ulcerations. NEURO: Intact to sensory and motor testing. VASC: Capillary refill is less than 3 seconds. KNEE EXAM    LOCATION: Right Knee  GEN: Alert and oriented X 3, in no acute distress. GAIT: The patient's gait was observed while entering the exam room and was noted to be non antalgic. The extremity is in anatomic alignment.   SKIN: Intact without rashes, lesions, or ulcerations. No obvious deformity or swelling. NEURO: The patient responds to light touch throughout bilateral LE. Patellar and Achilles reflexes are 2/4. VASC: The bilateral LE is neurovascularly intact with 2/4 DP and 2/4 PT pulses. Brisk capillary refill. ROM: 0/137 degrees. There is no effusion. MUSC: good quad tone  LIGAMENT: Lachman's test is 1+ with Good endpoint. Anterior drawer Negative. Posterior drawer Negative. There is No varus instability at 0 degrees and No varus instability at 30 degrees. There is No valgus instability at 0 degrees and No valgus instability at 30 degrees. SPECIAL: Kathi test is negative with no clunks, + crepitation, and minimal pain. PALP: There is medial joint line pain. Assessment:     1. Primary osteoarthritis of right knee      Procedures:    Procedure: yes    Regular Knee Injection    Location: Right Knee  Procedure: I discussed in detail the risks, benefits and complications of the corticosteroid injection which included but are not limited to: infection, skin reactions, hot swollen, and anaphylaxis with the patient. [address] verbalized understanding and they have agreed to have the corticosteroid injection into the right knee. The patient was placed in the Supine position on the exam table. The superior lateral portal was identified and marked with a ball point pen. The skin was prepped with betadine in a sterile fashion. Utilizing a [employer] ultrasound unit with a variable frequency linear transducer and clean technique with sterile gloves, a 3 cc solution containing 2 cc of 1% lidocaine   with 1 cc containing 80 mg of Depo-medrol  was injected. There was no resistance to the injection. The wound was cleansed and a band-aid was placed. the patient tolerated the procedure without difficulty.  Adverse reactions to the injection were discussed with the patient including signs of infection (increasing pain, redness, swelling) and the patient was instructed to call immediately if experiencing any of these symptoms. Images of the injection site were recorded throughout the procedure and are saved on the SD card which is stored in the [employer] ultrasound unit. All images were downloaded and stored in patient's chart. Radiology:   KNEE X-RAY    4 views of the right knee including AP, bilateral tunnel, and lateral in the upright position, and skyline views reveal anatomic alignment with no fracture or dislocation. Kellgren grade III changes of osteoarthritis (joint space narrowing, osteophyte, subchondral sclerosis, bony deformity/cyst) of the tricompartment(s). No osseous loose bodies. No bony erosion or periosteal reaction. No soft tissue masses. Impression: Moderate osteoarthritis of the right knee. Plan:   Treatment : I reviewed the x-ray with the patient and I informed them that the x-ray shows moderate osteoarthritis of the right knee. We discussed the etiologies and natural histories of right knee osteoarthritis. We discussed the various treatment alternatives including anti-inflammatory medications, physical therapy, injections, further imaging studies and as a last result surgery. During today's visit, we discussed that he does have moderate arthritis in his knee which could just be getting aggravated because he is back with softball. I do not see anything on his exam today to make me especially concern for a meniscal tear. He could have some degenerative meniscal tears just due to the fact that he has had multiple surgeries and a torn ACL but I am not sure another arthroscopy would be helpful. We discussed adding a lubrication injection to the cortisone if the cortisone does not last.  His last injection of cortisone lasted almost a year so he would like to just continue with cortisone injections as needed. I did explain that he should return to his exercises that he was given in physical therapy since he states he has not been doing those lately.   The patient has opted for a cortisone injection into the right knee to help reduce inflammation and pain. The injection site should never get red, hot, or swollen and if it does the patient will contact our office right away. The patient may experience a increase in soreness the first 24-48 hours due to a cortisone flair and can take anti-inflammatories for a short period of time to reduce that soreness. The patient should not submerge the injection site in water for a minimum of 24 hours to avoid infection. This means no lakes, pools, ponds, or hot tubs for 24 hours. If the patient is diabetic the injection may increase their blood sugar for up to one week. The patient can do this cortisone injection once every 3 months as needed. If the injections stop working and do not give the patient relief the patient should consider surgical interventions to produce long term relief. if he begins having more mechanical catching and locking or sharp stabbing pain and the injection does not help, we could consider getting an MRI of his right knee for surgical planning. Patient should return to the clinic as needed to follow up with  Dania Cowan PA-C. The patient will call the office immediately with any problems. Orders Placed This Encounter   Medications    DISCONTD: methylPREDNISolone acetate (DEPO-MEDROL) injection 80 mg    lidocaine 1 % injection 2 mL    methylPREDNISolone acetate (DEPO-MEDROL) injection 80 mg    DISCONTD: lidocaine 1 % injection 2 mL       No orders of the defined types were placed in this encounter. This note is created with the assistance of a speech recognition program.  While intending to generate a document that actually reflects the content of the visit, the document can still have some errors including those of syntax and sound a like substitutions which may escape proof reading.   In such instances, actual meaning can be extrapolated by contextual diversion    Electronically signed by Narayan Wilson PA-C, on 3/30/2022 at 11:56 AM

## 2022-05-16 ENCOUNTER — TELEPHONE (OUTPATIENT)
Dept: SURGERY | Age: 47
End: 2022-05-16

## 2022-05-16 RX ORDER — BISACODYL 5 MG
TABLET, DELAYED RELEASE (ENTERIC COATED) ORAL
Qty: 2 TABLET | Refills: 0 | Status: ON HOLD | OUTPATIENT
Start: 2022-05-16 | End: 2022-07-07 | Stop reason: ALTCHOICE

## 2022-05-16 RX ORDER — POLYETHYLENE GLYCOL 3350, SODIUM SULFATE ANHYDROUS, SODIUM BICARBONATE, SODIUM CHLORIDE, POTASSIUM CHLORIDE 236; 22.74; 6.74; 5.86; 2.97 G/4L; G/4L; G/4L; G/4L; G/4L
POWDER, FOR SOLUTION ORAL
Qty: 4000 ML | Refills: 0 | Status: ON HOLD | OUTPATIENT
Start: 2022-05-16 | End: 2022-07-07 | Stop reason: ALTCHOICE

## 2022-05-16 NOTE — TELEPHONE ENCOUNTER
Spoke with patient at this time scheduled for a colonoscopy Thursday 7/7/22 with Dr. Sapna Victoria at [facility]. Surgery instructions and bowel prep went over with patient at this time, denies any questions. Bowel prep sent to pharmacy of choice and all instructions mailed at this time. Acknowledges understanding of procedure and will call with any further questions.

## 2022-06-06 RX ORDER — VALACYCLOVIR HYDROCHLORIDE 1 G/1
TABLET, FILM COATED ORAL
Qty: 90 TABLET | Refills: 3 | Status: SHIPPED | OUTPATIENT
Start: 2022-06-06

## 2022-06-06 RX ORDER — LISINOPRIL 20 MG/1
20 TABLET ORAL DAILY
Qty: 90 TABLET | Refills: 3 | Status: SHIPPED | OUTPATIENT
Start: 2022-06-06

## 2022-06-06 RX ORDER — ATORVASTATIN CALCIUM 10 MG/1
10 TABLET, FILM COATED ORAL DAILY
Qty: 90 TABLET | Refills: 1 | Status: SHIPPED | OUTPATIENT
Start: 2022-06-06

## 2022-07-07 ENCOUNTER — HOSPITAL ENCOUNTER (OUTPATIENT)
Age: 47
Setting detail: OUTPATIENT SURGERY
Discharge: HOME | End: 2022-07-07
Attending: SURGERY | Admitting: SURGERY
Payer: COMMERCIAL

## 2022-07-07 ENCOUNTER — ANESTHESIA (OUTPATIENT)
Dept: OPERATING ROOM | Age: 47
End: 2022-07-07
Payer: COMMERCIAL

## 2022-07-07 ENCOUNTER — ANESTHESIA EVENT (OUTPATIENT)
Dept: OPERATING ROOM | Age: 47
End: 2022-07-07
Payer: COMMERCIAL

## 2022-07-07 VITALS
RESPIRATION RATE: 16 BRPM | HEIGHT: 72 IN | TEMPERATURE: 97 F | OXYGEN SATURATION: 98 % | HEART RATE: 83 BPM | DIASTOLIC BLOOD PRESSURE: 75 MMHG | WEIGHT: 265.2 LBS | BODY MASS INDEX: 35.92 KG/M2 | SYSTOLIC BLOOD PRESSURE: 127 MMHG

## 2022-07-07 PROCEDURE — 45378 DIAGNOSTIC COLONOSCOPY: CPT | Performed by: SURGERY

## 2022-07-07 PROCEDURE — 3700000001 HC ADD 15 MINUTES (ANESTHESIA): Performed by: SURGERY

## 2022-07-07 PROCEDURE — 6360000002 HC RX W HCPCS: Performed by: NURSE ANESTHETIST, CERTIFIED REGISTERED

## 2022-07-07 PROCEDURE — 7100000010 HC PHASE II RECOVERY - FIRST 15 MIN: Performed by: SURGERY

## 2022-07-07 PROCEDURE — 3609027000 HC COLONOSCOPY: Performed by: SURGERY

## 2022-07-07 PROCEDURE — 7100000011 HC PHASE II RECOVERY - ADDTL 15 MIN: Performed by: SURGERY

## 2022-07-07 PROCEDURE — 2709999900 HC NON-CHARGEABLE SUPPLY: Performed by: SURGERY

## 2022-07-07 PROCEDURE — 2580000003 HC RX 258: Performed by: SURGERY

## 2022-07-07 PROCEDURE — 3700000000 HC ANESTHESIA ATTENDED CARE: Performed by: SURGERY

## 2022-07-07 RX ORDER — SODIUM CHLORIDE, SODIUM LACTATE, POTASSIUM CHLORIDE, CALCIUM CHLORIDE 600; 310; 30; 20 MG/100ML; MG/100ML; MG/100ML; MG/100ML
INJECTION, SOLUTION INTRAVENOUS CONTINUOUS
Status: DISCONTINUED | OUTPATIENT
Start: 2022-07-07 | End: 2022-07-07 | Stop reason: HOSPADM

## 2022-07-07 RX ORDER — SODIUM CHLORIDE 0.9 % (FLUSH) 0.9 %
5-40 SYRINGE (ML) INJECTION EVERY 12 HOURS SCHEDULED
Status: DISCONTINUED | OUTPATIENT
Start: 2022-07-07 | End: 2022-07-07 | Stop reason: HOSPADM

## 2022-07-07 RX ORDER — SODIUM CHLORIDE 9 MG/ML
INJECTION, SOLUTION INTRAVENOUS PRN
Status: DISCONTINUED | OUTPATIENT
Start: 2022-07-07 | End: 2022-07-07 | Stop reason: HOSPADM

## 2022-07-07 RX ORDER — SODIUM CHLORIDE 0.9 % (FLUSH) 0.9 %
5-40 SYRINGE (ML) INJECTION PRN
Status: DISCONTINUED | OUTPATIENT
Start: 2022-07-07 | End: 2022-07-07 | Stop reason: HOSPADM

## 2022-07-07 RX ORDER — PROPOFOL 10 MG/ML
INJECTION, EMULSION INTRAVENOUS PRN
Status: DISCONTINUED | OUTPATIENT
Start: 2022-07-07 | End: 2022-07-07 | Stop reason: SDUPTHER

## 2022-07-07 RX ADMIN — SODIUM CHLORIDE, POTASSIUM CHLORIDE, SODIUM LACTATE AND CALCIUM CHLORIDE: 600; 310; 30; 20 INJECTION, SOLUTION INTRAVENOUS at 10:25

## 2022-07-07 RX ADMIN — PROPOFOL 300 MG: 10 INJECTION, EMULSION INTRAVENOUS at 10:53

## 2022-07-07 RX ADMIN — PROPOFOL 100 MG: 10 INJECTION, EMULSION INTRAVENOUS at 10:51

## 2022-07-07 ASSESSMENT — PAIN - FUNCTIONAL ASSESSMENT: PAIN_FUNCTIONAL_ASSESSMENT: 0-10

## 2022-07-07 ASSESSMENT — PAIN SCALES - GENERAL: PAINLEVEL_OUTOF10: 0

## 2022-07-07 ASSESSMENT — PAIN DESCRIPTION - DESCRIPTORS: DESCRIPTORS: CRAMPING

## 2022-07-07 NOTE — H&P
Subjective   Britt Cruz is a 50 y.o. male who presents today for initial screening colonoscopy. Pt denies any recent changes in bowel movements, blood per rectum, melena or unintended weight loss. No reported family hx of colon cancer. Past Medical History:   Diagnosis Date    Depression     /obsessive-compulsive disorder, Dr. Alexis Swann.  Gastric bypass status for obesity 9/6/13    preop wt 338.2lb    GERD (gastroesophageal reflux disease)     1) EGD 06/08 with minimal esophagitis, not confirmed with biopsy.  Hyperlipidemia     2% risk over 10 years on calculator December 2014    Hypertension     Hypogonadism male     1) Erectile dysfunction. 2) Gynecomastia. 3) Dr. Javier Suh, endocrinology, [facility] evaluation 07/08. 4) MRI of head 08/06.  Impaired fasting glucose     Obesity     BMI 45, 06/08., Bariatric surg 9/13    Obstructive sleep apnea     11/08, CPAP 10.- noncompliant 2016    Psoriasis     Treated with topical steroids Dr Darling Copake   Betamethasone cream and lotion    Sensorineural hearing loss     1) Dr. Amos Diaz evaluation, 08/06. MRI of head negative 08/06.  Thyroid nodule     right sided, fine needle aspiration 01/09 by Dr. Javier Suh negative. 1) Repeat ultrasound 06/10 stable  Dr. Javier Suh. 2) Fine needle aspiration repeat 10/11, negative.  Vitamin D deficiency     (Dr. Javier Suh). Past Surgical History:   Procedure Laterality Date    KNEE SURGERY  1994, 2009    right acl tear    ATUL-EN-Y GASTRIC BYPASS  09/18/2013    laproscopic    UPPER GASTROINTESTINAL ENDOSCOPY  2008    WISDOM TOOTH EXTRACTION  1997       No current facility-administered medications for this encounter.      Current Outpatient Medications   Medication Sig Dispense Refill    atorvastatin (LIPITOR) 10 MG tablet Take 1 tablet by mouth daily 90 tablet 1    lisinopril (PRINIVIL;ZESTRIL) 20 MG tablet Take 1 tablet by mouth daily 90 tablet 3    valACYclovir (VALTREX) 1 g tablet TAKE 1 TABLET BY MOUTH ONE TIME DAILY 90 tablet 3    bisacodyl (BISACODYL) 5 MG EC tablet Take two tablets at noon on 7/6/22 for colonoscopy bowel prep. 2 tablet 0    PEG 3350-KCl-NaBcb-NaCl-NaSulf (PEG-3350/ELECTROLYTES) 236 g SOLR Mix as directed. Beginning at 4:00pm on 7/6/22 drink an eight ounce glass every ten minutes until gone. 4000 mL 0    DULoxetine (CYMBALTA) 60 MG extended release capsule Take 1 capsule by mouth daily 90 capsule 3    EPINEPHrine (EPIPEN 2-PRESTON) 0.3 MG/0.3ML SOAJ injection Use as directed for allergic reaction (Patient not taking: Reported on 1/4/2022)      Multiple Vitamin (MVI, CELEBRATE, CHEWABLE TABLET) Take 1 tablet by mouth 2 times daily.          Allergies   Allergen Reactions    Effexor [Venlafaxine] Other (See Comments)     Muscle spasms    Shellfish-Derived Products Swelling     Throat swelling    Morphine Hives and Nausea Only     nausea       Family History   Problem Relation Age of Onset    Arthritis Mother     Depression Mother     Heart Disease Mother     High Blood Pressure Mother     High Cholesterol Mother     Diabetes Mother     Other Mother         stent placed at age 79    Thyroid Disease Mother     Arthritis Maternal Grandmother     Heart Disease Maternal Grandfather     High Blood Pressure Maternal Grandfather     Cancer Sister         breast    Asthma Brother     Diabetes Maternal Aunt     High Cholesterol Maternal Aunt     High Blood Pressure Maternal Uncle     High Cholesterol Maternal Uncle     Hearing Loss Paternal Grandfather     Glaucoma Other        Social History     Socioeconomic History    Marital status:      Spouse name: Not on file    Number of children: Not on file    Years of education: Not on file    Highest education level: Not on file   Occupational History    Not on file   Tobacco Use    Smoking status: Never Smoker    Smokeless tobacco: Never Used   Vaping Use    Vaping Use: Never used   Substance and Sexual Activity    Alcohol use: No     Comment: occassional, pt willing to avoid for at least 6 month post bariatric surgery    Drug use: No    Sexual activity: Not on file     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    Not on file   Social History Narrative    Not on file     Social Determinants of Health     Financial Resource Strain:     Difficulty of Paying Living Expenses: Not on file   Food Insecurity:     Worried About Running Out of Food in the Last Year: Not on file    Kade of Food in the Last Year: Not on file   Transportation Needs:     Lack of Transportation (Medical): Not on file    Lack of Transportation (Non-Medical): Not on file   Physical Activity:     Days of Exercise per Week: Not on file    Minutes of Exercise per Session: Not on file   Stress:     Feeling of Stress : Not on file   Social Connections:     Frequency of Communication with Friends and Family: Not on file    Frequency of Social Gatherings with Friends and Family: Not on file    Attends [employer]ist Services: Not on file    Active Member of [address] [place] [address] or Organizations: Not on file    Attends Club or Organization Meetings: Not on file    Marital Status: Not on file   Intimate Partner Violence:     Fear of Current or Ex-Partner: Not on file    Emotionally Abused: Not on file    Physically Abused: Not on file    Sexually Abused: Not on file   Housing Stability:     Unable to Pay for Housing in the Last Year: Not on file    Number of [place] in the Last Year: Not on file    Unstable Housing in the Last Year: Not on file       ROS:   Review of Systems - Negative except as noted in HPI      Objective   There were no vitals filed for this visit. General:in no apparent distress  Eyes: No gross abnormalities.   Ears, Nose, Throat: hearing grossly normal bilaterally  Neck: neck supple and non tender without mass  Lungs: clear to auscultation without wheezes or rales   Heart: S1S2, no mumurs, RRR  Abdomen: soft, nontender, no HSM, no guarding, no rebound, no masses  Extremity: negative  Neuro: CN II-XII grossly intact      Assessment     1. Screening colonoscopy      Plan     1.  Proceed as planned    Electronically signed by Patrick Lozada DO on 7/6/2022 at 9:49 PM      (Please note that portions of this note were completed with a voice recognition program.  Efforts were made to edit the dictations but occasionally words are mis-transcribed.)

## 2022-07-07 NOTE — ANESTHESIA POSTPROCEDURE EVALUATION
Department of Anesthesiology  Postprocedure Note    Patient: Patti Echeverria  MRN: [id number]  YOB: 1975  Date of evaluation: 7/7/2022      Procedure Summary     Date: 07/07/22 Room / Location: [address]    Anesthesia Start: 3843 Anesthesia Stop: 1110    Procedure: COLONOSCOPY (N/A Anus) Diagnosis:       Colon cancer screening      (screening)    Surgeons: Bharati Thomas DO Responsible Provider: MARI Phipps CRNA    Anesthesia Type: general ASA Status: 3          Anesthesia Type: No value filed.     Krishna Phase I: Krishna Score: 10    Krishna Phase II:        Anesthesia Post Evaluation    Patient location during evaluation: bedside  Patient participation: complete - patient participated  Level of consciousness: awake and alert  Pain score: 0  Airway patency: patent  Nausea & Vomiting: no vomiting  Complications: no  Cardiovascular status: hemodynamically stable  Respiratory status: acceptable  Hydration status: euvolemic

## 2022-07-07 NOTE — OP NOTE
[facility]                 [address] [place] [address]                                OPERATIVE REPORT    PATIENT NAME: Hallie Tenorio                   :        1975  MED REC NO:   [id number]                             ROOM:  ACCOUNT NO:   [de-identified]                           ADMIT DATE: 2022  PROVIDER:     Jenni Love    DATE OF PROCEDURE:  2022    SURGEON:  Dr. Jenni Love. ASSISTANT:  None. PREOPERATIVE DIAGNOSIS:  Screening. POSTOPERATIVE DIAGNOSES:  1.  Screening. 2.  Diverticulosis. PROCEDURE:  Colonoscopy. ANESTHESIA:  MAC.    ESTIMATED BLOOD LOSS:  Minimal.    FLUIDS:  Per anesthesia record. COMPLICATIONS:  None. SPECIMEN:  None. INDICATIONS FOR PROCEDURE:  The patient is a 25-year-old gentleman who  is referred to my office for initial screening colonoscopy. After  evaluation, decision was made to proceed. Prior to the time of the  procedure, risks, benefits, and alternatives were explained to the  patient and consent was obtained. DESCRIPTION OF PROCEDURE:  The patient was brought to endoscopy suite,  kept on preoperative gurney and placed in the left lateral decubitus  position. Monitoring devices were placed. MAC anesthesia was induced. After induction of anesthesia, time-out was performed and correct  patient and procedure were verified. Digital rectal exam was performed  which showed no abnormalities. The Olympus video endoscope was  lubricated, inserted into the patient's rectum which was gently  insufflated with air. Scope was then slowly advanced through the colon  under visualization to the level of the cecum which was identified by  appendiceal orifice and ileocecal valve. During advancement of the  scope, bowel prep was noted to be adequate. The scope was then slowly  withdrawn through the colon with withdrawal time being greater than 7  minutes.   During this time, colonic mucosa was carefully inspected. No  polyps, masses or other lesions were noted throughout the colon. The  patient was noted to have diverticular disease in the sigmoid and  descending colon with no evidence of inflammation. Once in the rectum,  a retroflex view was obtained which showed no distal rectal  abnormalities. Scope was then straightened and removed and procedure  was concluded. At conclusion of the procedure, the patient was in  stable condition and was taken to the PACU for recovery. PLAN:  I will recommend that the patient have repeat colonoscopy in 10  years for screening purposes.         Hany Lebron    D: 07/07/2022 11:11:36       T: 07/07/2022 11:15:02     [email or web address]  Job#: [id number]     Doc#: [id number]    CC:  Primary Care

## 2022-07-07 NOTE — ANESTHESIA PRE PROCEDURE
Department of Anesthesiology  Preprocedure Note       Name:  Lukasz Mixon   Age:  50 y.o.  :  1975                                          MRN:  [id number]         Date:  2022      Surgeon: Nancy De La Cruz):  Jason Hamilton DO    Procedure: Procedure(s):  COLONOSCOPY    Medications prior to admission:   Prior to Admission medications    Medication Sig Start Date End Date Taking? Authorizing Provider   atorvastatin (LIPITOR) 10 MG tablet Take 1 tablet by mouth daily 22   Christina Hart MD   lisinopril (PRINIVIL;ZESTRIL) 20 MG tablet Take 1 tablet by mouth daily 22   Christina Hart MD   valACYclovir (VALTREX) 1 g tablet TAKE 1 TABLET BY MOUTH ONE TIME DAILY 22   Christina Hart MD   DULoxetine (CYMBALTA) 60 MG extended release capsule Take 1 capsule by mouth daily 2/10/22   Christina Hart MD   EPINEPHrine (EPIPEN 2-PRESTON) 0.3 MG/0.3ML SOAJ injection Use as directed for allergic reaction  Patient not taking: Reported on 2022    Christina Hart MD   Multiple Vitamin (MVI, CELEBRATE, CHEWABLE TABLET) Take 1 tablet by mouth 2 times daily. Historical Provider, MD       Current medications:    Current Facility-Administered Medications   Medication Dose Route Frequency Provider Last Rate Last Admin    lactated ringers infusion   IntraVENous Continuous Gomez Limes,  mL/hr at 22 1025 New Bag at 22 1025    sodium chloride flush 0.9 % injection 5-40 mL  5-40 mL IntraVENous 2 times per day Gomez Limes, DO        sodium chloride flush 0.9 % injection 5-40 mL  5-40 mL IntraVENous PRN Gomez Limes, DO        0.9 % sodium chloride infusion   IntraVENous PRN Gomez Limes, DO           Allergies:     Allergies   Allergen Reactions    Effexor [Venlafaxine] Other (See Comments)     Muscle spasms    Shellfish-Derived Products Swelling     Throat swelling    Morphine Hives and Nausea Only     nausea       Problem List:    Patient Active Problem List   Diagnosis Code    Hypertension I10    GERD (gastroesophageal reflux disease) K21.9    Thyroid nodule E04.1    Vitamin D deficiency E55.9    Impaired fasting glucose R73.01    Hypogonadism male E29.1    YOSI - noncompliant CPAP G47.33    Status post bariatric surgery Z98.84    Myopia of both eyes with astigmatism H52.13, H52.203    Right shoulder pain M25.511    Mixed hyperlipidemia E78.2    Major depression, chronic F32.9    Obesity (BMI 30-39. 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 Kun Mejia.  Gastric bypass status for obesity 9/6/13    preop wt 338.2lb    GERD (gastroesophageal reflux disease)     1) EGD 06/08 with minimal esophagitis, not confirmed with biopsy.  Hyperlipidemia     2% risk over 10 years on calculator December 2014    Hypertension     Hypogonadism male     1) Erectile dysfunction. 2) Gynecomastia. 3) Dr. Alejandra Hatch, endocrinology, [facility] evaluation 07/08. 4) MRI of head 08/06.  Impaired fasting glucose     Obesity     BMI 45, 06/08., Bariatric surg 9/13    Obstructive sleep apnea     11/08, CPAP 10.- noncompliant 2016    Psoriasis     Treated with topical steroids Dr Bobbetta Dandy   Betamethasone cream and lotion    Sensorineural hearing loss     1) Dr. Anh Bergman evaluation, 08/06. MRI of head negative 08/06.  Thyroid nodule     right sided, fine needle aspiration 01/09 by Dr. Alejandra Hatch negative. 1) Repeat ultrasound 06/10 stable  Dr. Alejandra Hatch. 2) Fine needle aspiration repeat 10/11, negative.  Vitamin D deficiency     (Dr. Alejandra Hatch).        Past Surgical History:        Procedure Laterality Date    KNEE SURGERY  1994, 2009    right acl tear    ALKPHOS 86 01/26/2022 08:12 AM    AST 30 01/26/2022 08:12 AM    ALT 44 01/26/2022 08:12 AM       POC Tests: No results for input(s): POCGLU, POCNA, POCK, POCCL, POCBUN, POCHEMO, POCHCT in the last 72 hours. Coags: No results found for: PROTIME, INR, APTT    HCG (If Applicable): No results found for: PREGTESTUR, PREGSERUM, HCG, HCGQUANT     ABGs: No results found for: PHART, PO2ART, EZE4PHV, FIC1OSD, BEART, R9MTZUYP     Type & Screen (If Applicable):  No results found for: LABABO, LABRH    Drug/Infectious Status (If Applicable):  No results found for: HIV, HEPCAB    COVID-19 Screening (If Applicable):   Lab Results   Component Value Date/Time    COVID19 Detected 09/02/2020 11:48 AM           Anesthesia Evaluation  Patient summary reviewed and Nursing notes reviewed  Airway: Mallampati: II  TM distance: >3 FB   Neck ROM: full  Mouth opening: > = 3 FB   Dental:          Pulmonary:normal exam    (+) sleep apnea: on noncompliant,                             Cardiovascular:  Exercise tolerance: no interval change,   (+) hypertension: no interval change,                   Neuro/Psych:   (+) psychiatric history: stable with treatmentdepression/anxiety             GI/Hepatic/Renal:   (+) GERD: no interval change,           Endo/Other: Negative Endo/Other ROS                    Abdominal:             Vascular: Other Findings:           Anesthesia Plan      general     ASA 3       Induction: intravenous. Anesthetic plan and risks discussed with patient. Use of blood products discussed with patient whom consented to blood products.    Plan discussed with surgical team.                    MARI More - SISI   7/7/2022

## 2022-08-10 ENCOUNTER — HOSPITAL ENCOUNTER (OUTPATIENT)
Dept: LAB | Age: 47
Discharge: HOME | End: 2022-08-10
Payer: COMMERCIAL

## 2022-08-10 DIAGNOSIS — R73.01 IMPAIRED FASTING GLUCOSE: ICD-10-CM

## 2022-08-10 DIAGNOSIS — E78.2 MIXED HYPERLIPIDEMIA: ICD-10-CM

## 2022-08-10 LAB
ABSOLUTE EOS #: 0.1 K/UL (ref 0–0.44)
ABSOLUTE IMMATURE GRANULOCYTE: <0.03 K/UL (ref 0–0.3)
ABSOLUTE LYMPH #: 1.88 K/UL (ref 1.1–3.7)
ABSOLUTE MONO #: 0.35 K/UL (ref 0.1–1.2)
ALBUMIN SERPL-MCNC: 4.6 G/DL (ref 3.5–5.2)
ALBUMIN/GLOBULIN RATIO: 1.9 (ref 1–2.5)
ALP BLD-CCNC: 92 U/L (ref 40–129)
ALT SERPL-CCNC: 32 U/L (ref 5–41)
ANION GAP SERPL CALCULATED.3IONS-SCNC: 8 MMOL/L (ref 9–17)
AST SERPL-CCNC: 25 U/L
BASOPHILS # BLD: 0 % (ref 0–2)
BASOPHILS ABSOLUTE: <0.03 K/UL (ref 0–0.2)
BILIRUB SERPL-MCNC: 0.57 MG/DL (ref 0.3–1.2)
BUN BLDV-MCNC: 14 MG/DL (ref 6–20)
BUN/CREAT BLD: 15 (ref 9–20)
CALCIUM SERPL-MCNC: 9.5 MG/DL (ref 8.6–10.4)
CHLORIDE BLD-SCNC: 101 MMOL/L (ref 98–107)
CHOLESTEROL/HDL RATIO: 4.3
CHOLESTEROL: 178 MG/DL
CO2: 29 MMOL/L (ref 20–31)
CREAT SERPL-MCNC: 0.92 MG/DL (ref 0.7–1.2)
EOSINOPHILS RELATIVE PERCENT: 2 % (ref 1–4)
ESTIMATED AVERAGE GLUCOSE: 123 MG/DL
GFR AFRICAN AMERICAN: >60 ML/MIN
GFR NON-AFRICAN AMERICAN: >60 ML/MIN
GFR SERPL CREATININE-BSD FRML MDRD: ABNORMAL ML/MIN/{1.73_M2}
GLUCOSE BLD-MCNC: 115 MG/DL (ref 70–99)
HBA1C MFR BLD: 5.9 % (ref 4–6)
HCT VFR BLD CALC: 42.2 % (ref 40.7–50.3)
HDLC SERPL-MCNC: 41 MG/DL
HEMOGLOBIN: 14.7 G/DL (ref 13–17)
IMMATURE GRANULOCYTES: 0 %
LDL CHOLESTEROL: 110 MG/DL (ref 0–130)
LYMPHOCYTES # BLD: 34 % (ref 24–43)
MCH RBC QN AUTO: 31.5 PG (ref 25.2–33.5)
MCHC RBC AUTO-ENTMCNC: 34.8 G/DL (ref 25.2–33.5)
MCV RBC AUTO: 90.4 FL (ref 82.6–102.9)
MONOCYTES # BLD: 6 % (ref 3–12)
NRBC AUTOMATED: 0 PER 100 WBC
PDW BLD-RTO: 12.7 % (ref 11.8–14.4)
PLATELET # BLD: 201 K/UL (ref 138–453)
PMV BLD AUTO: 10.8 FL (ref 8.1–13.5)
POTASSIUM SERPL-SCNC: 4.1 MMOL/L (ref 3.7–5.3)
RBC # BLD: 4.67 M/UL (ref 4.21–5.77)
SEG NEUTROPHILS: 58 % (ref 36–65)
SEGMENTED NEUTROPHILS ABSOLUTE COUNT: 3.2 K/UL (ref 1.5–8.1)
SODIUM BLD-SCNC: 138 MMOL/L (ref 135–144)
TOTAL PROTEIN: 7 G/DL (ref 6.4–8.3)
TRIGL SERPL-MCNC: 133 MG/DL
WBC # BLD: 5.6 K/UL (ref 3.5–11.3)

## 2022-08-10 PROCEDURE — 80053 COMPREHEN METABOLIC PANEL: CPT

## 2022-08-10 PROCEDURE — 36415 COLL VENOUS BLD VENIPUNCTURE: CPT

## 2022-08-10 PROCEDURE — 83036 HEMOGLOBIN GLYCOSYLATED A1C: CPT

## 2022-08-10 PROCEDURE — 80061 LIPID PANEL: CPT

## 2022-08-10 PROCEDURE — 85025 COMPLETE CBC W/AUTO DIFF WBC: CPT

## 2022-08-11 ENCOUNTER — HOSPITAL ENCOUNTER (OUTPATIENT)
Dept: LAB | Age: 47
Discharge: HOME | End: 2022-08-11
Payer: COMMERCIAL

## 2022-08-11 ENCOUNTER — OFFICE VISIT (OUTPATIENT)
Dept: INTERNAL MEDICINE | Age: 47
End: 2022-08-11
Payer: COMMERCIAL

## 2022-08-11 VITALS
OXYGEN SATURATION: 97 % | WEIGHT: 271.2 LBS | HEIGHT: 72 IN | RESPIRATION RATE: 16 BRPM | HEART RATE: 87 BPM | SYSTOLIC BLOOD PRESSURE: 138 MMHG | DIASTOLIC BLOOD PRESSURE: 62 MMHG | BODY MASS INDEX: 36.73 KG/M2

## 2022-08-11 DIAGNOSIS — N40.1 BENIGN PROSTATIC HYPERPLASIA WITH LOWER URINARY TRACT SYMPTOMS, SYMPTOM DETAILS UNSPECIFIED: ICD-10-CM

## 2022-08-11 DIAGNOSIS — Z00.00 ENCOUNTER FOR WELL ADULT EXAM WITHOUT ABNORMAL FINDINGS: ICD-10-CM

## 2022-08-11 DIAGNOSIS — R73.01 IMPAIRED FASTING GLUCOSE: ICD-10-CM

## 2022-08-11 DIAGNOSIS — E78.2 MIXED HYPERLIPIDEMIA: ICD-10-CM

## 2022-08-11 DIAGNOSIS — N40.1 BENIGN PROSTATIC HYPERPLASIA WITH LOWER URINARY TRACT SYMPTOMS, SYMPTOM DETAILS UNSPECIFIED: Primary | ICD-10-CM

## 2022-08-11 DIAGNOSIS — I10 HYPERTENSION, UNSPECIFIED TYPE: ICD-10-CM

## 2022-08-11 LAB
BACTERIA: ABNORMAL
BILIRUBIN URINE: NEGATIVE
EPITHELIAL CELLS UA: ABNORMAL /HPF (ref 0–5)
GLUCOSE URINE: NEGATIVE
KETONES, URINE: NEGATIVE
LEUKOCYTE ESTERASE, URINE: NEGATIVE
MUCUS: ABNORMAL
NITRITE, URINE: NEGATIVE
PH UA: 5.5 (ref 5–6)
PROTEIN UA: NEGATIVE
RBC UA: ABNORMAL /HPF (ref 0–4)
SPECIFIC GRAVITY UA: 1.03 (ref 1.01–1.02)
URINE HGB: NEGATIVE
UROBILINOGEN, URINE: NORMAL
WBC UA: ABNORMAL /HPF (ref 0–4)

## 2022-08-11 PROCEDURE — 99396 PREV VISIT EST AGE 40-64: CPT | Performed by: INTERNAL MEDICINE

## 2022-08-11 PROCEDURE — 81001 URINALYSIS AUTO W/SCOPE: CPT

## 2022-08-11 RX ORDER — TAMSULOSIN HYDROCHLORIDE 0.4 MG/1
0.4 CAPSULE ORAL DAILY
Qty: 90 CAPSULE | Refills: 1 | Status: SHIPPED | OUTPATIENT
Start: 2022-08-11 | End: 2022-08-30 | Stop reason: SDUPTHER

## 2022-08-11 SDOH — ECONOMIC STABILITY: FOOD INSECURITY: WITHIN THE PAST 12 MONTHS, YOU WORRIED THAT YOUR FOOD WOULD RUN OUT BEFORE YOU GOT MONEY TO BUY MORE.: NEVER TRUE

## 2022-08-11 SDOH — ECONOMIC STABILITY: FOOD INSECURITY: WITHIN THE PAST 12 MONTHS, THE FOOD YOU BOUGHT JUST DIDN'T LAST AND YOU DIDN'T HAVE MONEY TO GET MORE.: NEVER TRUE

## 2022-08-11 ASSESSMENT — PATIENT HEALTH QUESTIONNAIRE - PHQ9
SUM OF ALL RESPONSES TO PHQ QUESTIONS 1-9: 5
4. FEELING TIRED OR HAVING LITTLE ENERGY: 2
2. FEELING DOWN, DEPRESSED OR HOPELESS: 0
SUM OF ALL RESPONSES TO PHQ QUESTIONS 1-9: 5
3. TROUBLE FALLING OR STAYING ASLEEP: 1
9. THOUGHTS THAT YOU WOULD BE BETTER OFF DEAD, OR OF HURTING YOURSELF: 0
7. TROUBLE CONCENTRATING ON THINGS, SUCH AS READING THE NEWSPAPER OR WATCHING TELEVISION: 2
SUM OF ALL RESPONSES TO PHQ9 QUESTIONS 1 & 2: 0
SUM OF ALL RESPONSES TO PHQ QUESTIONS 1-9: 5
SUM OF ALL RESPONSES TO PHQ QUESTIONS 1-9: 5
10. IF YOU CHECKED OFF ANY PROBLEMS, HOW DIFFICULT HAVE THESE PROBLEMS MADE IT FOR YOU TO DO YOUR WORK, TAKE CARE OF THINGS AT HOME, OR GET ALONG WITH OTHER PEOPLE: 0
6. FEELING BAD ABOUT YOURSELF - OR THAT YOU ARE A FAILURE OR HAVE LET YOURSELF OR YOUR FAMILY DOWN: 0
1. LITTLE INTEREST OR PLEASURE IN DOING THINGS: 0
8. MOVING OR SPEAKING SO SLOWLY THAT OTHER PEOPLE COULD HAVE NOTICED. OR THE OPPOSITE, BEING SO FIGETY OR RESTLESS THAT YOU HAVE BEEN MOVING AROUND A LOT MORE THAN USUAL: 0
5. POOR APPETITE OR OVEREATING: 0

## 2022-08-11 ASSESSMENT — SOCIAL DETERMINANTS OF HEALTH (SDOH): HOW HARD IS IT FOR YOU TO PAY FOR THE VERY BASICS LIKE FOOD, HOUSING, MEDICAL CARE, AND HEATING?: NOT VERY HARD

## 2022-08-11 NOTE — PROGRESS NOTES
Well Adult Note  Name: Juliann Sanchez Date: 2022   MRN: [id number] Sex: Male   Age: 50 y.o. Ethnicity: Non- / Non    : 1975 Race: White (non-)      Genaro Rivera is here for well adult exam.  History:  Reports that he has been having urinary frequency for the last several months. Denies fever, chills, dysuria, hematuria. I advised that this is likely BPH, and that we can start Flomax, check a UA, and check a PSA level as well. He agrees to this and we will reassess next visit. Otherwise has a history of hypertension, hyperlipidemia, for which she was recently started on statin, and it seems that his cholesterol and LDL levels have dropped. Review of Systems    Allergies   Allergen Reactions    Effexor [Venlafaxine] Other (See Comments)     Muscle spasms    Shellfish-Derived Products Swelling     Throat swelling    Morphine Hives and Nausea Only     nausea         Prior to Visit Medications    Medication Sig Taking? Authorizing Provider   tamsulosin (FLOMAX) 0.4 MG capsule Take 1 capsule by mouth in the morning. Yes Andrés Osorio MD   atorvastatin (LIPITOR) 10 MG tablet Take 1 tablet by mouth daily Yes Andrés Osorio MD   lisinopril (PRINIVIL;ZESTRIL) 20 MG tablet Take 1 tablet by mouth daily Yes Andrés Osorio MD   valACYclovir (VALTREX) 1 g tablet TAKE 1 TABLET BY MOUTH ONE TIME DAILY Yes Andrés Osorio MD   DULoxetine (CYMBALTA) 60 MG extended release capsule Take 1 capsule by mouth daily Yes Andrés Osorio MD   EPINEPHrine (EPIPEN) 0.3 MG/0.3ML SOAJ injection Use as directed for allergic reaction Yes Andrés Osorio MD   Multiple Vitamin (MVI, CELEBRATE, CHEWABLE TABLET) Take 1 tablet by mouth 2 times daily. Yes Historical Provider, MD         Past Medical History:   Diagnosis Date    Depression     /obsessive-compulsive disorder, Dr. Kun Mejia.     Gastric bypass status for obesity 13    preop wt 338.2lb    GERD (gastroesophageal reflux disease)     1) EGD  with minimal esophagitis, not confirmed with biopsy. Hyperlipidemia     2% risk over 10 years on calculator December 2014    Hypertension     Hypogonadism male     1) Erectile dysfunction. 2) Gynecomastia. 3) Dr. Aayush Wolfe, endocrinology, [facility] evaluation 07/08. 4) MRI of head 08/06. Impaired fasting glucose     Obesity     BMI 45, 06/08., Bariatric surg 9/13    Obstructive sleep apnea     11/08, CPAP 10.- noncompliant 2016    Psoriasis     Treated with topical steroids Dr Henrietta Hay   Betamethasone cream and lotion    Sensorineural hearing loss     1) Dr. Robin aRo evaluation, 08/06. MRI of head negative 08/06. Thyroid nodule     right sided, fine needle aspiration 01/09 by Dr. Aayush Wolfe negative. 1) Repeat ultrasound 06/10 stable  Dr. Aayush Wolfe. 2) Fine needle aspiration repeat 10/11, negative. Vitamin D deficiency     (Dr. Aayush Wolfe).        Past Surgical History:   Procedure Laterality Date    COLONOSCOPY N/A 7/7/2022    COLONOSCOPY performed by Ora Jiménez DO at [facility], 2009    right acl tear    ATUL-EN-Y GASTRIC BYPASS  09/18/2013    laproscopic    UPPER GASTROINTESTINAL ENDOSCOPY  2008    WISDOM TOOTH EXTRACTION  1997         Family History   Problem Relation Age of Onset    Arthritis Mother     Depression Mother     Heart Disease Mother     High Blood Pressure Mother     High Cholesterol Mother     Diabetes Mother     Other Mother         stent placed at age 79    Thyroid Disease Mother     Arthritis Maternal Grandmother     Heart Disease Maternal Grandfather     High Blood Pressure Maternal Grandfather     Cancer Sister         breast    Asthma Brother     Diabetes Maternal Aunt     High Cholesterol Maternal Aunt     High Blood Pressure Maternal Uncle     High Cholesterol Maternal Uncle     Hearing Loss Paternal Grandfather     Glaucoma Other        Social History     Tobacco Use    Smoking status: Never    Smokeless tobacco: Never   Vaping Use    Vaping Use: Never used   Substance Use Topics    Alcohol use: No     Comment: occassional, pt willing to avoid for at least 6 month post bariatric surgery    Drug use: No       Objective   /62 (Site: Left Upper Arm, Position: Sitting, Cuff Size: Medium Adult)   Pulse 87   Resp 16   Ht 6' (1.829 m)   Wt 271 lb 3.2 oz (123 kg)   SpO2 97%   BMI 36.78 kg/m²   Wt Readings from Last 3 Encounters:   08/11/22 271 lb 3.2 oz (123 kg)   07/07/22 265 lb 3.2 oz (120.3 kg)   03/30/22 270 lb (122.5 kg)     There were no vitals filed for this visit. Physical Exam      Assessment   Plan   1. Benign prostatic hyperplasia with lower urinary tract symptoms, symptom details unspecified  -     Urinalysis with Reflex to Culture; Future  -     PSA, Diagnostic; Future  2. Hypertension, unspecified type  -     CBC with Auto Differential; Future  -     Comprehensive Metabolic Panel; Future  3. Mixed hyperlipidemia  -     Lipid Panel; Future  4. Impaired fasting glucose  -     Hemoglobin A1C; Future  5.  Encounter for well adult exam without abnormal findings         Personalized Preventive Plan   Current Health Maintenance Status  Immunization History   Administered Date(s) Administered    COVID-19, MODERNA BLUE border, Primary or Immunocompromised, (age 12y+), IM, 100 mcg/0.5mL 02/12/2021, 03/12/2021, 10/29/2021    DT (pediatric) 09/22/2005    Influenza Virus Vaccine 11/28/2000, 11/02/2007, 11/14/2012, 11/07/2017, 11/15/2020    Influenza, Estil Darya, IM, PF (6 mo and older Fluzone, Flulaval, Fluarix, and 3 yrs and older Afluria) 11/03/2016    Tdap (Boostrix, Adacel) 06/28/2016        Health Maintenance   Topic Date Due    Hepatitis C screen  Never done    Depression Monitoring  09/02/2021    Flu vaccine (1) 09/01/2022    A1C test (Diabetic or Prediabetic)  08/10/2023    Lipids  08/10/2023    DTaP/Tdap/Td vaccine (3 - Td or Tdap) 06/28/2026    Colorectal Cancer Screen  07/13/2032    COVID-19 Vaccine  Completed    HIV screen  Addressed    Hepatitis A vaccine  Aged Out    Hepatitis B vaccine  Aged Out    Hib vaccine  Aged Out    Meningococcal (ACWY) vaccine  Aged Out    Pneumococcal 0-64 years Vaccine  Aged Out     Recommendations for [employer] Due: see orders and patient instructions/AVS.    No follow-ups on file.

## 2022-08-30 RX ORDER — DULOXETIN HYDROCHLORIDE 60 MG/1
60 CAPSULE, DELAYED RELEASE ORAL DAILY
Qty: 90 CAPSULE | Refills: 3 | Status: SHIPPED | OUTPATIENT
Start: 2022-08-30

## 2022-08-30 RX ORDER — TAMSULOSIN HYDROCHLORIDE 0.4 MG/1
0.4 CAPSULE ORAL DAILY
Qty: 90 CAPSULE | Refills: 1 | Status: SHIPPED | OUTPATIENT
Start: 2022-08-30

## 2022-08-30 NOTE — TELEPHONE ENCOUNTER
Fax request refill     Medication pended if agreeable    Last Appt:  8/11/2022  Next Appt:   2/13/2023  Med verified in Epic

## 2022-09-29 ENCOUNTER — OFFICE VISIT (OUTPATIENT)
Dept: ORTHOPEDIC SURGERY | Age: 47
End: 2022-09-29
Payer: COMMERCIAL

## 2022-09-29 VITALS
SYSTOLIC BLOOD PRESSURE: 125 MMHG | RESPIRATION RATE: 12 BRPM | HEIGHT: 72 IN | BODY MASS INDEX: 36.7 KG/M2 | DIASTOLIC BLOOD PRESSURE: 85 MMHG | WEIGHT: 271 LBS | HEART RATE: 82 BPM

## 2022-09-29 DIAGNOSIS — M17.11 PRIMARY OSTEOARTHRITIS OF RIGHT KNEE: Primary | ICD-10-CM

## 2022-09-29 PROCEDURE — 20611 DRAIN/INJ JOINT/BURSA W/US: CPT | Performed by: PHYSICIAN ASSISTANT

## 2022-09-29 RX ORDER — METHYLPREDNISOLONE ACETATE 40 MG/ML
40 INJECTION, SUSPENSION INTRA-ARTICULAR; INTRALESIONAL; INTRAMUSCULAR; SOFT TISSUE ONCE
Status: COMPLETED | OUTPATIENT
Start: 2022-09-29 | End: 2022-09-29

## 2022-09-29 RX ORDER — LIDOCAINE HYDROCHLORIDE 10 MG/ML
2 INJECTION, SOLUTION INFILTRATION; PERINEURAL ONCE
Status: COMPLETED | OUTPATIENT
Start: 2022-09-29 | End: 2022-09-29

## 2022-09-29 RX ADMIN — METHYLPREDNISOLONE ACETATE 40 MG: 40 INJECTION, SUSPENSION INTRA-ARTICULAR; INTRALESIONAL; INTRAMUSCULAR; SOFT TISSUE at 15:11

## 2022-09-29 RX ADMIN — LIDOCAINE HYDROCHLORIDE 2 ML: 10 INJECTION, SOLUTION INFILTRATION; PERINEURAL at 15:11

## 2022-09-29 ASSESSMENT — ENCOUNTER SYMPTOMS
RESPIRATORY NEGATIVE: 1
CONSTIPATION: 0
COUGH: 0
CHEST TIGHTNESS: 0
NAUSEA: 0
APNEA: 0
ABDOMINAL PAIN: 0
SHORTNESS OF BREATH: 0
ABDOMINAL DISTENTION: 0
DIARRHEA: 0
VOMITING: 0
COLOR CHANGE: 0

## 2022-09-29 NOTE — PROGRESS NOTES
[address] [place] [address] AND SPORTS MEDICINE  [address] [place] [id number]  Dept: [phone]  Dept Fax: [phone]          Right Knee - Follow Up     Subjective:     Chief Complaint   Patient presents with    Knee Pain     R Knee (LI 3/30/22)     HPI:     Cecil Hayward presents today for right knee pain. His last cortisone injection in the right knee was at his last visit on 3/30/22, and he states 100% improvement for a month and then gradually wore off. At his last visit, we discussed trying a lubrication injection, or if the cortisone injection did not work and he has more mechanical catching and locking, that we may consider an MRI of the knee for surgical planning. ROS:   Review of Systems   Constitutional:  Positive for activity change. Negative for appetite change, fatigue and fever. Respiratory: Negative. Negative for apnea, cough, chest tightness and shortness of breath. Cardiovascular: Negative. Negative for chest pain, palpitations and leg swelling. Gastrointestinal:  Negative for abdominal distention, abdominal pain, constipation, diarrhea, nausea and vomiting. Genitourinary:  Negative for difficulty urinating, dysuria and hematuria. Musculoskeletal:  Positive for arthralgias, gait problem and joint swelling. Negative for myalgias. Skin:  Negative for color change and rash. Neurological:  Negative for dizziness, weakness, numbness and headaches. Psychiatric/Behavioral:  Negative for sleep disturbance. Past Medical History:    Past Medical History:   Diagnosis Date    Depression     /obsessive-compulsive disorder, Dr. Feliz Mcfadden. Gastric bypass status for obesity 9/6/13    preop wt 338.2lb    GERD (gastroesophageal reflux disease)     1) EGD 06/08 with minimal esophagitis, not confirmed with biopsy.      Hyperlipidemia     2% risk over 10 years on calculator December 2014    Hypertension Hypogonadism male     1) Erectile dysfunction. 2) Gynecomastia. 3) Dr. Paulette Avila, endocrinology, [facility] evaluation 07/08. 4) MRI of head 08/06. Impaired fasting glucose     Obesity     BMI 45, 06/08., Bariatric surg 9/13    Obstructive sleep apnea     11/08, CPAP 10.- noncompliant 2016    Psoriasis     Treated with topical steroids Dr Alison Cuellar   Betamethasone cream and lotion    Sensorineural hearing loss     1) Dr. Fransico Goins evaluation, 08/06. MRI of head negative 08/06. Thyroid nodule     right sided, fine needle aspiration 01/09 by Dr. Paulette Avila negative. 1) Repeat ultrasound 06/10 stable  Dr. Paulette Avila. 2) Fine needle aspiration repeat 10/11, negative. Vitamin D deficiency     (Dr. Paulette Avila). Past Surgical History:    Past Surgical History:   Procedure Laterality Date    COLONOSCOPY N/A 7/7/2022    COLONOSCOPY performed by Tita Correa DO at [place] [address], 2009    right acl tear    ATUL-EN-Y GASTRIC BYPASS  09/18/2013    laproscopic    UPPER GASTROINTESTINAL ENDOSCOPY  2008    WISDOM TOOTH EXTRACTION  1997       CurrentMedications:   Current Outpatient Medications   Medication Sig Dispense Refill    tamsulosin (FLOMAX) 0.4 MG capsule Take 1 capsule by mouth daily 90 capsule 1    DULoxetine (CYMBALTA) 60 MG extended release capsule Take 1 capsule by mouth daily 90 capsule 3    atorvastatin (LIPITOR) 10 MG tablet Take 1 tablet by mouth daily 90 tablet 1    lisinopril (PRINIVIL;ZESTRIL) 20 MG tablet Take 1 tablet by mouth daily 90 tablet 3    valACYclovir (VALTREX) 1 g tablet TAKE 1 TABLET BY MOUTH ONE TIME DAILY 90 tablet 3    EPINEPHrine (EPIPEN) 0.3 MG/0.3ML SOAJ injection Use as directed for allergic reaction      Multiple Vitamin (MVI, CELEBRATE, CHEWABLE TABLET) Take 1 tablet by mouth 2 times daily. No current facility-administered medications for this visit.        Allergies:    Effexor [venlafaxine], Shellfish-derived products, and Morphine    Social History:   Social History     Socioeconomic History    Marital status:      Spouse name: None    Number of children: None    Years of education: None    Highest education level: None   Tobacco Use    Smoking status: Never    Smokeless tobacco: Never   Vaping Use    Vaping Use: Never used   Substance and Sexual Activity    Alcohol use: No     Comment: occassional, pt willing to avoid for at least 6 month post bariatric surgery    Drug use: No     Social Determinants of Health     Financial Resource Strain: Low Risk     Difficulty of Paying Living Expenses: Not very hard   Food Insecurity: No Food Insecurity    Worried About Running Out of Food in the Last Year: Never true    Ran Out of Food in the Last Year: Never true       Family History:  Family History   Problem Relation Age of Onset    Arthritis Mother     Depression Mother     Heart Disease Mother     High Blood Pressure Mother     High Cholesterol Mother     Diabetes Mother     Other Mother         stent placed at age 79    Thyroid Disease Mother     Arthritis Maternal Grandmother     Heart Disease Maternal Grandfather     High Blood Pressure Maternal Grandfather     Cancer Sister         breast    Asthma Brother     Diabetes Maternal Aunt     High Cholesterol Maternal Aunt     High Blood Pressure Maternal Uncle     High Cholesterol Maternal Uncle     Hearing Loss Paternal Grandfather     Glaucoma Other        Vitals:   /85   Pulse 82   Resp 12   Ht 6' (1.829 m)   Wt 271 lb (122.9 kg)   BMI 36.75 kg/m²  Body mass index is 36.75 kg/m². Physical Examination:     Orthopedics:    GENERAL: Alert and oriented X3 in no acute distress. SKIN: Intact without lesions or ulcerations. NEURO: Intact to sensory and motor testing. VASC: Capillary refill is less than 3 seconds. KNEE EXAM    LOCATION: Right Knee  GEN: Alert and oriented X 3, in no acute distress.   GAIT: The patient's gait was observed while entering the exam room and was noted to be non antalgic. The extremity is in anatomic alignment. SKIN: Intact without rashes, lesions, or ulcerations. No obvious deformity or swelling. NEURO: The patient responds to light touch throughout bilateral LE. Patellar and Achilles reflexes are 2/4. VASC: The bilateral LE is neurovascularly intact with 2/4 DP and 2/4 PT pulses. Brisk capillary refill. ROM: 0/125 degrees. There is mild effusion. MUSC: good quad tone  LIGAMENT: Lachman's test is 2+ with Good endpoint. Anterior drawer 1+. Posterior drawer Negative. There is No varus instability at 0 degrees and No varus instability at 30 degrees. There is No valgus instability at 0 degrees and No valgus instability at 30 degrees. SPECIAL: Kathi test is negative with no clunks, + crepitation, and + pain. PALP: There is  no  joint line pain. Assessment:     1. Primary osteoarthritis of right knee      Procedures:    Procedure: yes    Regular Knee Injection    Location: Right Knee  Procedure: I discussed in detail the risks, benefits and complications of the corticosteroid injection which included but are not limited to: infection, skin reactions, hot swollen, and anaphylaxis with the patient. [address] verbalized understanding and they have agreed to have the corticosteroid injection into the right knee. The patient was placed in the Supine position on the exam table. The superior lateral portal was identified and marked with a ball point pen. The skin was prepped with betadine in a sterile fashion. Utilizing a "[employer]," ultrasound unit with a variable frequency linear transducer and clean technique with sterile gloves, a 3 cc solution containing 2 cc of 1% lidocaine   with 1 cc containing 40 mg of Depo-Medrol was injected. There was no resistance to the injection. The wound was cleansed and a band-aid was placed. the patient tolerated the procedure without difficulty.  Adverse reactions to the injection were discussed with the patient including signs of infection (increasing pain, redness, swelling) and the patient was instructed to call immediately if experiencing any of these symptoms. Images of the injection site were recorded throughout the procedure and are saved on the SD card which is stored in the [employer] ultrasound unit. All images were downloaded and stored in patient's chart. Radiology:   Previous x-ray reviewed  Plan:   Treatment : We discussed the etiologies and natural histories of moderate osteoarthritis of the right knee with a ACL deficit. We discussed the various treatment alternatives including anti-inflammatory medications, physical therapy, injections, further imaging studies and as a last result surgery. During today's visit, without having an ACL the knee will shift more and you will get more arthritic changes over time. Eventually only thing this can fix his knee is knee replacement. A small surgery like a knee arthroscopy would not be helpful at this time. His complaint is achy tooth ache pain not mechanical catching or locking. The patient has opted for a cortisone injection into the right knee to help reduce inflammation and pain. The injection site should never get red, hot, or swollen and if it does the patient will contact our office right away. The patient may experience a increase in soreness the first 24-48 hours due to a cortisone flair and can take anti-inflammatories for a short period of time to reduce that soreness. The patient should not submerge the injection site in water for a minimum of 24 hours to avoid infection. This means no lakes, pools, ponds, or hot tubs for 24 hours. If the patient is diabetic the injection may increase their blood sugar for up to one week. The patient can do this cortisone injection once every 3 months as needed. If the injections stop working and do not give the patient relief the patient should consider surgical interventions to produce long term relief.  Patient should return to the clinic after Synvisc prior authorization to follow up with  Suzan Diane PA-C. The patient will call the office immediately with any problems. Orders Placed This Encounter   Medications    methylPREDNISolone acetate (DEPO-MEDROL) injection 40 mg    lidocaine 1 % injection 2 mL         No orders of the defined types were placed in this encounter. This note is created with the assistance of a speech recognition program.  While intending to generate a document that actually reflects the content of the visit, the document can still have some errors including those of syntax and sound a like substitutions which may escape proof reading.   In such instances, actual meaning can be extrapolated by contextual diversion    Electronically signed by Claude Brenner PA-C, on 10/3/2022 at 5:27 PM

## 2022-10-13 ENCOUNTER — TELEPHONE (OUTPATIENT)
Dept: ORTHOPEDIC SURGERY | Age: 47
End: 2022-10-13

## 2022-11-22 NOTE — TELEPHONE ENCOUNTER
Never rec'vd response from pts insurance company regarding PA. Resent with notes on todays date. Waiting response.  cml

## 2022-12-02 DIAGNOSIS — M17.11 PRIMARY OSTEOARTHRITIS OF RIGHT KNEE: Primary | ICD-10-CM

## 2022-12-02 NOTE — TELEPHONE ENCOUNTER
Gel not covered through medical benefit but, most likely covered through RX [facility] with [facility]. Spoke with patient. Will order and he will contact our office once he gets the call to schedule delivery and I will schedule the appt once I hear back from the patient. Monovisc preferred with plan.  cml

## 2022-12-02 NOTE — TELEPHONE ENCOUNTER
Synvisc  not covered through medical.  Fred Cesar maybe covered through [facility]. Informed pt. Sent med request to [facility]. Pt will notify me once he hears from pharmacy to schedule delivery.

## 2022-12-04 RX ORDER — HYALURONATE SODIUM, STABILIZED 88 MG/4 ML
88 SYRINGE (ML) INTRAARTICULAR ONCE
Qty: 1 EACH | Refills: 0 | Status: SHIPPED | OUTPATIENT
Start: 2022-12-04 | End: 2022-12-04

## 2022-12-05 RX ORDER — ATORVASTATIN CALCIUM 10 MG/1
10 TABLET, FILM COATED ORAL DAILY
Qty: 90 TABLET | Refills: 3 | Status: SHIPPED | OUTPATIENT
Start: 2022-12-05

## 2022-12-08 NOTE — TELEPHONE ENCOUNTER
Pts RX coverage sent a questions which I filled out and left for Jorge Bella to sign along with office notes need to be [facility] to [facility].     Elizabeth Caraballo -- packet is on your desk please take care of on 12/9/22

## 2022-12-15 NOTE — TELEPHONE ENCOUNTER
Received approval.  PT will be sent medication through [facility]. We can schedule as soon as we get the medication. Patient knows to call us upon scheduling shipment.  carlos

## 2022-12-21 ENCOUNTER — OFFICE VISIT (OUTPATIENT)
Dept: OTOLARYNGOLOGY | Age: 47
End: 2022-12-21
Payer: COMMERCIAL

## 2022-12-21 VITALS
HEIGHT: 72 IN | SYSTOLIC BLOOD PRESSURE: 138 MMHG | DIASTOLIC BLOOD PRESSURE: 84 MMHG | BODY MASS INDEX: 36.7 KG/M2 | HEART RATE: 85 BPM | WEIGHT: 271 LBS | OXYGEN SATURATION: 98 % | RESPIRATION RATE: 16 BRPM

## 2022-12-21 DIAGNOSIS — H90.A32 MIXED CONDUCTIVE AND SENSORINEURAL HEARING LOSS OF LEFT EAR WITH RESTRICTED HEARING OF RIGHT EAR: ICD-10-CM

## 2022-12-21 DIAGNOSIS — H90.A21 SENSORINEURAL HEARING LOSS (SNHL) OF RIGHT EAR WITH RESTRICTED HEARING OF LEFT EAR: ICD-10-CM

## 2022-12-21 DIAGNOSIS — H61.23 BILATERAL IMPACTED CERUMEN: Primary | ICD-10-CM

## 2022-12-21 PROCEDURE — 99213 OFFICE O/P EST LOW 20 MIN: CPT | Performed by: OTOLARYNGOLOGY

## 2022-12-21 PROCEDURE — 3074F SYST BP LT 130 MM HG: CPT | Performed by: OTOLARYNGOLOGY

## 2022-12-21 PROCEDURE — 69210 REMOVE IMPACTED EAR WAX UNI: CPT | Performed by: OTOLARYNGOLOGY

## 2022-12-21 PROCEDURE — 3078F DIAST BP <80 MM HG: CPT | Performed by: OTOLARYNGOLOGY

## 2022-12-21 RX ORDER — HYALURONATE SODIUM, STABILIZED 88 MG/4 ML
SYRINGE (ML) INTRAARTICULAR
COMMUNITY
Start: 2022-12-04

## 2022-12-21 RX ORDER — CALCIUM CARBONATE/VITAMIN D3 500 MG-10
TABLET,CHEWABLE ORAL
COMMUNITY

## 2022-12-21 NOTE — PROGRESS NOTES
12/21/2022 9:51 AM EDT    Chief Complaint:   Chief Complaint   Patient presents with    Cerumen Impaction     1 year ear clean       The patient is a 50y.o.  year old  male  who presents with a complaint of right hearing loss with onset 3-4 weeks ago. He and his family were diagnosed with covid during that same time and were in isolation. About 1 week into isolation he developed sudden worsening of his right hearing which used to be his \"good\" ear. It has improved since it started but has been stable for about 2-3 weeks. Had audio several years ago that showed right sided hearing loss. He declined an aid at that time. 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 Some tinnitus bilateral that started several weeks ago as well. Prone to motion sickness. When he stands up he gets a dizzy sensation for 5-10 seconds. Feels like passing out but hasn't. Sits down and it passes. Has been told it may be related to BP/dehydration. Today Tracy Barroso states that the hearing is baseline. He has known left-sided hearing loss. States that he has difficulty reading lips with the masks. His wife states that he listens to the TV loudly. He does not notice significant quality of life impact from any hearing loss. No concerns with sound localization. He is not interested in hearing aids at this time. Audio 12/23/20  Right hearing mild to moderate sensorineural hearing loss at 1.5 to 6 kHz  Left hearing essentially moderate mixed hearing loss  Conductive component on the left at 250 500 1000hz. Type A tympanometry bilateral  Word recognition score 96% bilateral    Audio 1/26/22:  Right hearing mild to moderate snhl 1500-8000hz  Left hearing mild to moderately severe mixed hl  Slightly worse threshold AS on the 2k, 4k, 8k hz   WDS 96% on the right, 88% on the left   Type A AU     There was no specific precipitating event. There are no aggravating or mitigating factors.     Social History     Substance and Sexual Activity   Alcohol Use No    Comment: occassional, pt willing to avoid for at least 6 month post bariatric surgery     Social History     Tobacco Use   Smoking Status Never   Smokeless Tobacco Never     Drug Sensitivities: Effexor [venlafaxine], Shellfish-derived products, and Morphine  Medications:  Outpatient Medications Prior to Visit   Medication Sig Dispense Refill    Calcium Carb-Cholecalciferol 500-10 MG-MCG CHEW as directed      ACYCLOVIR 200 MG/5 ML ORAL SUSP CMPD       MONOVISC 88 MG/4ML injection       atorvastatin (LIPITOR) 10 MG tablet Take 1 tablet by mouth daily 90 tablet 3    tamsulosin (FLOMAX) 0.4 MG capsule Take 1 capsule by mouth daily 90 capsule 1    DULoxetine (CYMBALTA) 60 MG extended release capsule Take 1 capsule by mouth daily 90 capsule 3    lisinopril (PRINIVIL;ZESTRIL) 20 MG tablet Take 1 tablet by mouth daily 90 tablet 3    valACYclovir (VALTREX) 1 g tablet TAKE 1 TABLET BY MOUTH ONE TIME DAILY 90 tablet 3    EPINEPHrine (EPIPEN) 0.3 MG/0.3ML SOAJ injection Use as directed for allergic reaction      Multiple Vitamin (MVI, CELEBRATE, CHEWABLE TABLET) Take 1 tablet by mouth 2 times daily. No facility-administered medications prior to visit. Past Medical History:   Diagnosis Date    Depression     /obsessive-compulsive disorder, Dr. Claudine Colvin. Gastric bypass status for obesity 9/6/13    preop wt 338.2lb    GERD (gastroesophageal reflux disease)     1) EGD 06/08 with minimal esophagitis, not confirmed with biopsy. Hyperlipidemia     2% risk over 10 years on calculator December 2014    Hypertension     Hypogonadism male     1) Erectile dysfunction. 2) Gynecomastia. 3) Dr. Yvette Webb, endocrinology, [facility] evaluation 07/08. 4) MRI of head 08/06.      Impaired fasting glucose     Obesity     BMI 45, 06/08., Bariatric surg 9/13    Obstructive sleep apnea     11/08, CPAP 10.- noncompliant 2016    Psoriasis     Treated with topical steroids Dr Sita David   Betamethasone cream and lotion    Sensorineural hearing loss     1) Dr. Tahira Pepper evaluation, 08/06. MRI of head negative 08/06. Thyroid nodule     right sided, fine needle aspiration 01/09 by Dr. Isaac Bhatia negative. 1) Repeat ultrasound 06/10 stable  Dr. Isaac Bhatia. 2) Fine needle aspiration repeat 10/11, negative. Vitamin D deficiency     (Dr. Isaac Bhatia). Past Surgical History:   Procedure Laterality Date    COLONOSCOPY N/A 7/7/2022    COLONOSCOPY performed by Cathie Nova DO at [facility], 2009    right acl tear    ATUL-EN-Y GASTRIC BYPASS  09/18/2013    laproscopic    UPPER GASTROINTESTINAL ENDOSCOPY  2008    WISDOM TOOTH EXTRACTION  1997     Family History   Problem Relation Age of Onset    Arthritis Mother     Depression Mother     Heart Disease Mother     High Blood Pressure Mother     High Cholesterol Mother     Diabetes Mother     Other Mother         stent placed at age 79    Thyroid Disease Mother     Arthritis Maternal Grandmother     Heart Disease Maternal Grandfather     High Blood Pressure Maternal Grandfather     Cancer Sister         breast    Asthma Brother     Diabetes Maternal Aunt     High Cholesterol Maternal Aunt     High Blood Pressure Maternal Uncle     High Cholesterol Maternal Uncle     Hearing Loss Paternal Grandfather     Glaucoma Other      ROS:   Blood pressure 138/84, pulse 85, resp. rate 16, height 6' (1.829 m), weight 271 lb (122.9 kg), SpO2 98 %. General: The patient is found to be alert and normally responsive male with grossly normal hearing, clear voice and normal articulation. Communication is without difficulty. Voice: Clear   Skin: The skin has normal colour and turgor. Face: The facial contour is symmetric at rest and with movement. Ears: The pinnae have normal contours.     AD: EAC with cerumen, TM intact, no effusion/erythema/retraction   AS: EAC with cerumen, TM intact, no effusion/erythema/retraction   AS: Cerumen removed with alligator   AD: Cerumen removed with alligator  Eye: The ocular movements are full and symmetric, the conjunctiva is unremarkable; sclera are anicteric, pupillary response is symmetric. No nystagmus is found. Nose:   The external nasal contour is normal    IMP:    1. Bilateral impacted cerumen    2. Mixed conductive and sensorineural hearing loss of left ear with restricted hearing of right ear    3. Sensorineural hearing loss (SNHL) of right ear with restricted hearing of left ear           Plan:     Asymmetric audiogram with worse hearing on the left side however asymmetry is mostly attributed to a conductive component with essentially symmetrical bone lines. No evidence of effusion or infection. Bilateral ears in the high frequencies demonstrate a cookie bite pattern   Cochlear otosclerosis can be consistent with a cookie bite pattern with a mixed hearing loss pattern. Cookie bite hearing loss can also be associated with hereditary hearing loss which is at the top of the differential given his young age. Discussed options for rehab including referral to otology for middle ear exploration consultation, hearing aid, VNG balance test, and observation. He states that he will discuss these options with his wife. He is medically cleared for a hearing aid at this time. He is leaning towards observation. Recommend repeat audiogram.  Recommend sooner follow-up if dizziness or hearing loss progresses. Fu prn for ear cleaning    Orders: No orders of the defined types were placed in this encounter. Rx:   No orders of the defined types were placed in this encounter.

## 2022-12-22 ENCOUNTER — PROCEDURE VISIT (OUTPATIENT)
Dept: ORTHOPEDIC SURGERY | Age: 47
End: 2022-12-22
Payer: COMMERCIAL

## 2022-12-22 VITALS — WEIGHT: 271 LBS | RESPIRATION RATE: 12 BRPM | HEIGHT: 72 IN | BODY MASS INDEX: 36.7 KG/M2

## 2022-12-22 DIAGNOSIS — M17.11 PRIMARY OSTEOARTHRITIS OF RIGHT KNEE: Primary | ICD-10-CM

## 2022-12-22 DIAGNOSIS — S67.10XA CRUSHING INJURY OF FINGER OF RIGHT HAND: ICD-10-CM

## 2022-12-22 DIAGNOSIS — S69.91XA FINGER INJURY, RIGHT, INITIAL ENCOUNTER: Primary | ICD-10-CM

## 2022-12-22 PROCEDURE — 99213 OFFICE O/P EST LOW 20 MIN: CPT | Performed by: PHYSICIAN ASSISTANT

## 2022-12-22 PROCEDURE — 20611 DRAIN/INJ JOINT/BURSA W/US: CPT | Performed by: PHYSICIAN ASSISTANT

## 2022-12-22 NOTE — PROGRESS NOTES
[address] [place] [address] AND SPORTS MEDICINE  [address] [place] [id number]  Dept: [phone]  Dept Fax: [phone]          Right knee Visit - Follow up    Subjective:     Chief Complaint   Patient presents with    Knee Pain     R Knee Monovisc Injection     HPI:     Yessy Mcmullen presents today for Rightknee pain. Patient is here today for lubrication injections into Right knee. He had his last cortisone injection was on 9/29/2022. He has not had a lubrication injection previously. He states his knee is feeling pretty good. He is having an issue with his right index finger. He was at softball practice and was hit with a bat. His nail was bleeding. He asked if I would be able to take a look at it today just to make sure it was not broken. I have reviewed the CC, and if not present in this note, I have reviewed in the patient's chart. I agree with the documentation provided by other staff and have reviewed their documentation prior to providing my signature indicating agreement. Vitals:   Resp 12   Ht 6' (1.829 m)   Wt 271 lb (122.9 kg)   BMI 36.75 kg/m²  Body mass index is 36.75 kg/m². Orthopedics    GENERAL: Alert and oriented X3 in no acute distress. SKIN: Visual inspection reveals no soft tissue swelling or tonie abnormality, no rotational deformity, Intact without lesions or ulcerations. Bruising under the nail. No active bleeding but blood around the nailbed  NEURO: Radial, Ulnar and Median nerves are intact to sensory and motor testing. VASC: Capillary refill is less than 3 seconds, no signs of thoracic outlet syndrome, negative Philippe's test.    Hand & Wrist Exam    LOCATION: Right index finger  MUSC: Good strength is available in these motions. ROM: Full flexor and extensor tendon function is intact  PALPATION: pain over the DIP joint of the index finger. Nail is intact  Assessment:     1. Primary osteoarthritis of right knee    2. Crushing injury of finger of right hand        Procedure:   Procedure: Yes    Monovisc Injection    Location: Right knee  Procedure: Vj Peterson agreed for the Monovisc injection into the right knee. The patient was placed in the supine position on the exam table. The junction of the superior portion of the patella and the lateral portion of the patella was identified and marked. The skin was prepped with betadine in a sterile fashion. Utilizing [employer] ultrasound unit with a variable frequency linear transducer for precise placement and sterile technique, 4 ml of Monovisc was injected by the superior lateral approach. There was no resistance to injection. The wound was cleansed and a Band-Aid was placed. The patient tolerated the procedure without difficulty. Adverse reactions of the injection was discussed with the patient including signs of infection (increasing pain, redness, swelling) and the patient was instructed to call immediately with any of these symptoms. Ultrasound images of the injection site were recorded throughout the procedure and are saved on the SD card which is stored in the [employer] ultrasound unit. All images were downloaded and stored in patient's chart for permanent record. Plan:   I reviewed the films with the patient and he does not have a fracture of the distal phalanx of the index finger of the right hand. He can continue wearing the splint that he has on to keep it from hitting things because it so painful. He does not technically need a splint. He should continue working on range of motion. There is a possibility he could lose his nail. He should watch for any type of infection. Patient should return to the clinic as needed for the finger or for further cortisone injections in approximately 2 months or as needed for the knee to follow up with Scarlet Gordillo PA-C. The patient will call the office immediately with any problems. Orders Placed This Encounter   Medications    hyaluronate (MONOVISC) injection 88 mg       No orders of the defined types were placed in this encounter.           Electronically signed by Surekha Moss PA-C, on 12/22/2022 at 5:11 PM

## 2023-03-09 RX ORDER — TAMSULOSIN HYDROCHLORIDE 0.4 MG/1
CAPSULE ORAL
Qty: 90 CAPSULE | Refills: 1 | Status: SHIPPED | OUTPATIENT
Start: 2023-03-09

## 2023-08-24 ENCOUNTER — OFFICE VISIT (OUTPATIENT)
Dept: INTERNAL MEDICINE | Age: 48
End: 2023-08-24

## 2023-08-24 VITALS
HEART RATE: 79 BPM | RESPIRATION RATE: 16 BRPM | DIASTOLIC BLOOD PRESSURE: 72 MMHG | BODY MASS INDEX: 36.16 KG/M2 | HEIGHT: 72 IN | OXYGEN SATURATION: 98 % | SYSTOLIC BLOOD PRESSURE: 132 MMHG | WEIGHT: 267 LBS

## 2023-08-24 DIAGNOSIS — E66.01 SEVERE OBESITY (BMI 35.0-39.9) WITH COMORBIDITY (HCC): ICD-10-CM

## 2023-08-24 DIAGNOSIS — N40.1 BENIGN PROSTATIC HYPERPLASIA WITH LOWER URINARY TRACT SYMPTOMS, SYMPTOM DETAILS UNSPECIFIED: ICD-10-CM

## 2023-08-24 DIAGNOSIS — F33.1 MAJOR DEPRESSIVE DISORDER, RECURRENT, MODERATE (HCC): ICD-10-CM

## 2023-08-24 DIAGNOSIS — R73.01 IMPAIRED FASTING GLUCOSE: ICD-10-CM

## 2023-08-24 DIAGNOSIS — I10 HYPERTENSION, UNSPECIFIED TYPE: Primary | ICD-10-CM

## 2023-08-24 DIAGNOSIS — F33.0 MAJOR DEPRESSIVE DISORDER, RECURRENT, MILD (HCC): ICD-10-CM

## 2023-08-24 DIAGNOSIS — E78.2 MIXED HYPERLIPIDEMIA: ICD-10-CM

## 2023-08-24 DIAGNOSIS — Z00.00 ENCOUNTER FOR WELL ADULT EXAM WITHOUT ABNORMAL FINDINGS: ICD-10-CM

## 2023-08-24 PROBLEM — F33.9 MAJOR DEPRESSIVE DISORDER, RECURRENT, UNSPECIFIED (HCC): Status: ACTIVE | Noted: 2023-08-24

## 2023-08-24 RX ORDER — SEMAGLUTIDE 1.34 MG/ML
0.25 INJECTION, SOLUTION SUBCUTANEOUS WEEKLY
Qty: 2 ADJUSTABLE DOSE PRE-FILLED PEN SYRINGE | Refills: 0 | Status: SHIPPED | COMMUNITY
Start: 2023-08-24

## 2023-08-24 RX ORDER — DULOXETIN HYDROCHLORIDE 60 MG/1
60 CAPSULE, DELAYED RELEASE ORAL DAILY
Qty: 90 CAPSULE | Refills: 3 | Status: SHIPPED | OUTPATIENT
Start: 2023-08-24

## 2023-08-24 RX ORDER — ATORVASTATIN CALCIUM 10 MG/1
10 TABLET, FILM COATED ORAL DAILY
Qty: 90 TABLET | Refills: 3 | Status: SHIPPED | OUTPATIENT
Start: 2023-08-24

## 2023-08-24 RX ORDER — EPINEPHRINE 0.3 MG/.3ML
0.3 INJECTION SUBCUTANEOUS ONCE
Qty: 0.3 ML | Refills: 0 | Status: SHIPPED | OUTPATIENT
Start: 2023-08-24 | End: 2023-08-24

## 2023-08-24 RX ORDER — TAMSULOSIN HYDROCHLORIDE 0.4 MG/1
0.4 CAPSULE ORAL DAILY
Qty: 90 CAPSULE | Refills: 3 | Status: SHIPPED | OUTPATIENT
Start: 2023-08-24

## 2023-08-24 SDOH — ECONOMIC STABILITY: FOOD INSECURITY: WITHIN THE PAST 12 MONTHS, THE FOOD YOU BOUGHT JUST DIDN'T LAST AND YOU DIDN'T HAVE MONEY TO GET MORE.: NEVER TRUE

## 2023-08-24 SDOH — ECONOMIC STABILITY: HOUSING INSECURITY
IN THE LAST 12 MONTHS, WAS THERE A TIME WHEN YOU DID NOT HAVE A STEADY PLACE TO SLEEP OR SLEPT IN A SHELTER (INCLUDING NOW)?: NO

## 2023-08-24 SDOH — ECONOMIC STABILITY: FOOD INSECURITY: WITHIN THE PAST 12 MONTHS, YOU WORRIED THAT YOUR FOOD WOULD RUN OUT BEFORE YOU GOT MONEY TO BUY MORE.: NEVER TRUE

## 2023-08-24 SDOH — ECONOMIC STABILITY: INCOME INSECURITY: HOW HARD IS IT FOR YOU TO PAY FOR THE VERY BASICS LIKE FOOD, HOUSING, MEDICAL CARE, AND HEATING?: NOT VERY HARD

## 2023-08-24 ASSESSMENT — PATIENT HEALTH QUESTIONNAIRE - PHQ9
SUM OF ALL RESPONSES TO PHQ QUESTIONS 1-9: 0
1. LITTLE INTEREST OR PLEASURE IN DOING THINGS: 0
SUM OF ALL RESPONSES TO PHQ QUESTIONS 1-9: 0
8. MOVING OR SPEAKING SO SLOWLY THAT OTHER PEOPLE COULD HAVE NOTICED. OR THE OPPOSITE, BEING SO FIGETY OR RESTLESS THAT YOU HAVE BEEN MOVING AROUND A LOT MORE THAN USUAL: 0
SUM OF ALL RESPONSES TO PHQ QUESTIONS 1-9: 0
DEPRESSION UNABLE TO ASSESS: FUNCTIONAL CAPACITY MOTIVATION LIMITS ACCURACY
2. FEELING DOWN, DEPRESSED OR HOPELESS: 0
4. FEELING TIRED OR HAVING LITTLE ENERGY: 0
SUM OF ALL RESPONSES TO PHQ QUESTIONS 1-9: 0
7. TROUBLE CONCENTRATING ON THINGS, SUCH AS READING THE NEWSPAPER OR WATCHING TELEVISION: 0
3. TROUBLE FALLING OR STAYING ASLEEP: 0
6. FEELING BAD ABOUT YOURSELF - OR THAT YOU ARE A FAILURE OR HAVE LET YOURSELF OR YOUR FAMILY DOWN: 0
5. POOR APPETITE OR OVEREATING: 0
9. THOUGHTS THAT YOU WOULD BE BETTER OFF DEAD, OR OF HURTING YOURSELF: 0
10. IF YOU CHECKED OFF ANY PROBLEMS, HOW DIFFICULT HAVE THESE PROBLEMS MADE IT FOR YOU TO DO YOUR WORK, TAKE CARE OF THINGS AT HOME, OR GET ALONG WITH OTHER PEOPLE: 0
SUM OF ALL RESPONSES TO PHQ9 QUESTIONS 1 & 2: 0

## 2023-09-02 ENCOUNTER — HOSPITAL ENCOUNTER (OUTPATIENT)
Age: 48
Discharge: HOME | End: 2023-09-02
Payer: COMMERCIAL

## 2023-09-02 DIAGNOSIS — E78.2 MIXED HYPERLIPIDEMIA: ICD-10-CM

## 2023-09-02 DIAGNOSIS — I10 HYPERTENSION, UNSPECIFIED TYPE: ICD-10-CM

## 2023-09-02 DIAGNOSIS — N40.1 BENIGN PROSTATIC HYPERPLASIA WITH LOWER URINARY TRACT SYMPTOMS, SYMPTOM DETAILS UNSPECIFIED: ICD-10-CM

## 2023-09-02 DIAGNOSIS — R73.01 IMPAIRED FASTING GLUCOSE: ICD-10-CM

## 2023-09-02 LAB
ALBUMIN SERPL-MCNC: 4.6 G/DL (ref 3.5–5.2)
ALBUMIN/GLOB SERPL: 1.8 {RATIO} (ref 1–2.5)
ALP SERPL-CCNC: 82 U/L (ref 40–129)
ALT SERPL-CCNC: 40 U/L (ref 5–41)
ANION GAP SERPL CALCULATED.3IONS-SCNC: 8 MMOL/L (ref 9–17)
AST SERPL-CCNC: 29 U/L
BASOPHILS # BLD: <0.03 K/UL (ref 0–0.2)
BASOPHILS NFR BLD: 0 % (ref 0–2)
BILIRUB SERPL-MCNC: 0.5 MG/DL (ref 0.3–1.2)
BUN SERPL-MCNC: 15 MG/DL (ref 6–20)
BUN/CREAT SERPL: 17 (ref 9–20)
CALCIUM SERPL-MCNC: 9.6 MG/DL (ref 8.6–10.4)
CHLORIDE SERPL-SCNC: 101 MMOL/L (ref 98–107)
CHOLEST SERPL-MCNC: 202 MG/DL
CHOLESTEROL/HDL RATIO: 4.2
CO2 SERPL-SCNC: 29 MMOL/L (ref 20–31)
CREAT SERPL-MCNC: 0.9 MG/DL (ref 0.7–1.2)
EOSINOPHIL # BLD: 0.09 K/UL (ref 0–0.44)
EOSINOPHILS RELATIVE PERCENT: 2 % (ref 1–4)
ERYTHROCYTE [DISTWIDTH] IN BLOOD BY AUTOMATED COUNT: 13.3 % (ref 11.8–14.4)
GFR SERPL CREATININE-BSD FRML MDRD: >60 ML/MIN/1.73M2
GLUCOSE SERPL-MCNC: 134 MG/DL (ref 70–99)
HCT VFR BLD AUTO: 42.6 % (ref 40.7–50.3)
HDLC SERPL-MCNC: 48 MG/DL
HGB BLD-MCNC: 14.2 G/DL (ref 13–17)
IMM GRANULOCYTES # BLD AUTO: <0.03 K/UL (ref 0–0.3)
IMM GRANULOCYTES NFR BLD: 0 %
LDLC SERPL CALC-MCNC: 129 MG/DL (ref 0–130)
LYMPHOCYTES NFR BLD: 1.86 K/UL (ref 1.1–3.7)
LYMPHOCYTES RELATIVE PERCENT: 37 % (ref 24–43)
MCH RBC QN AUTO: 29.6 PG (ref 25.2–33.5)
MCHC RBC AUTO-ENTMCNC: 33.3 G/DL (ref 25.2–33.5)
MCV RBC AUTO: 88.9 FL (ref 82.6–102.9)
MONOCYTES NFR BLD: 0.35 K/UL (ref 0.1–1.2)
MONOCYTES NFR BLD: 7 % (ref 3–12)
NEUTROPHILS NFR BLD: 54 % (ref 36–65)
NEUTS SEG NFR BLD: 2.72 K/UL (ref 1.5–8.1)
NRBC BLD-RTO: 0 PER 100 WBC
PLATELET # BLD AUTO: 200 K/UL (ref 138–453)
PMV BLD AUTO: 10 FL (ref 8.1–13.5)
POTASSIUM SERPL-SCNC: 4.7 MMOL/L (ref 3.7–5.3)
PROT SERPL-MCNC: 7.1 G/DL (ref 6.4–8.3)
PSA SERPL-MCNC: 0.47 NG/ML
RBC # BLD AUTO: 4.79 M/UL (ref 4.21–5.77)
SODIUM SERPL-SCNC: 138 MMOL/L (ref 135–144)
TRIGL SERPL-MCNC: 126 MG/DL
WBC OTHER # BLD: 5.1 K/UL (ref 3.5–11.3)

## 2023-09-02 PROCEDURE — 80053 COMPREHEN METABOLIC PANEL: CPT

## 2023-09-02 PROCEDURE — 84153 ASSAY OF PSA TOTAL: CPT

## 2023-09-02 PROCEDURE — 83036 HEMOGLOBIN GLYCOSYLATED A1C: CPT

## 2023-09-02 PROCEDURE — 36415 COLL VENOUS BLD VENIPUNCTURE: CPT

## 2023-09-02 PROCEDURE — 85025 COMPLETE CBC W/AUTO DIFF WBC: CPT

## 2023-09-02 PROCEDURE — 80061 LIPID PANEL: CPT

## 2023-09-03 LAB
EST. AVERAGE GLUCOSE BLD GHB EST-MCNC: 134 MG/DL
HBA1C MFR BLD: 6.3 % (ref 4–6)

## 2023-09-11 RX ORDER — ATORVASTATIN CALCIUM 20 MG/1
20 TABLET, FILM COATED ORAL DAILY
Qty: 90 TABLET | Refills: 2 | Status: SHIPPED | OUTPATIENT
Start: 2023-09-11 | End: 2023-11-06 | Stop reason: SDUPTHER

## 2023-11-03 ENCOUNTER — PATIENT MESSAGE (OUTPATIENT)
Dept: INTERNAL MEDICINE | Age: 48
End: 2023-11-03

## 2023-11-03 NOTE — TELEPHONE ENCOUNTER
From: Erasmo Romero  To: Dr. Aleida Em: 11/3/2023 8:37 AM EDT  Subject: Atorvastatin 20mg Prescription    Good morning,    I am in need of a new order for atorvastatin 20mg, but I need the order to go to [facility] mail order, please.     Thank you,  Navid MURRAY 1975

## 2023-11-06 RX ORDER — ATORVASTATIN CALCIUM 20 MG/1
20 TABLET, FILM COATED ORAL DAILY
Qty: 90 TABLET | Refills: 2 | Status: SHIPPED | OUTPATIENT
Start: 2023-11-06

## 2023-11-09 ENCOUNTER — HOSPITAL ENCOUNTER (OUTPATIENT)
Age: 48
Discharge: HOME | End: 2023-11-09
Payer: COMMERCIAL

## 2023-11-09 ENCOUNTER — OFFICE VISIT (OUTPATIENT)
Dept: INTERNAL MEDICINE | Age: 48
End: 2023-11-09
Payer: COMMERCIAL

## 2023-11-09 VITALS
DIASTOLIC BLOOD PRESSURE: 72 MMHG | WEIGHT: 276 LBS | HEART RATE: 64 BPM | HEIGHT: 72 IN | BODY MASS INDEX: 37.38 KG/M2 | OXYGEN SATURATION: 98 % | SYSTOLIC BLOOD PRESSURE: 140 MMHG

## 2023-11-09 DIAGNOSIS — I10 HYPERTENSION, UNSPECIFIED TYPE: ICD-10-CM

## 2023-11-09 DIAGNOSIS — R07.9 CHEST PAIN, UNSPECIFIED TYPE: Primary | ICD-10-CM

## 2023-11-09 DIAGNOSIS — E78.2 MIXED HYPERLIPIDEMIA: ICD-10-CM

## 2023-11-09 DIAGNOSIS — N40.1 BENIGN PROSTATIC HYPERPLASIA WITH LOWER URINARY TRACT SYMPTOMS, SYMPTOM DETAILS UNSPECIFIED: ICD-10-CM

## 2023-11-09 LAB
EST. AVERAGE GLUCOSE BLD GHB EST-MCNC: 128 MG/DL
HBA1C MFR BLD: 6.1 % (ref 4–6)
T3FREE SERPL-MCNC: 3.32 PG/ML (ref 2.02–4.43)
T4 FREE SERPL-MCNC: 1 NG/DL (ref 0.9–1.7)
TSH SERPL DL<=0.05 MIU/L-ACNC: 0.8 UIU/ML (ref 0.3–5)

## 2023-11-09 PROCEDURE — 84481 FREE ASSAY (FT-3): CPT

## 2023-11-09 PROCEDURE — 3077F SYST BP >= 140 MM HG: CPT | Performed by: INTERNAL MEDICINE

## 2023-11-09 PROCEDURE — 99214 OFFICE O/P EST MOD 30 MIN: CPT | Performed by: INTERNAL MEDICINE

## 2023-11-09 PROCEDURE — 3078F DIAST BP <80 MM HG: CPT | Performed by: INTERNAL MEDICINE

## 2023-11-09 PROCEDURE — 84439 ASSAY OF FREE THYROXINE: CPT

## 2023-11-09 PROCEDURE — 93000 ELECTROCARDIOGRAM COMPLETE: CPT | Performed by: INTERNAL MEDICINE

## 2023-11-09 PROCEDURE — 83036 HEMOGLOBIN GLYCOSYLATED A1C: CPT

## 2023-11-09 PROCEDURE — 84443 ASSAY THYROID STIM HORMONE: CPT

## 2023-11-09 PROCEDURE — 36415 COLL VENOUS BLD VENIPUNCTURE: CPT

## 2023-11-09 NOTE — PROGRESS NOTES
07/08. 4) MRI of head 08/06. Impaired fasting glucose     Obesity     BMI 45, 06/08., Bariatric surg 9/13    Obstructive sleep apnea     11/08, CPAP 10.- noncompliant 2016    Psoriasis     Treated with topical steroids Dr Michelle Augustin   Betamethasone cream and lotion    Sensorineural hearing loss     1) Dr. Yamileth Mane evaluation, 08/06. MRI of head negative 08/06. Thyroid nodule     right sided, fine needle aspiration 01/09 by Dr. Beverly Arzate negative. 1) Repeat ultrasound 06/10 stable  Dr. Beverly Arzate. 2) Fine needle aspiration repeat 10/11, negative. Vitamin D deficiency     (Dr. Beverly Arzate). Past Surgical History:   Procedure Laterality Date    COLONOSCOPY N/A 7/7/2022    COLONOSCOPY performed by Nova Wills DO at [address] [place] [address], 2009    right acl tear    ATUL-EN-Y GASTRIC BYPASS  09/18/2013    laproscopic    UPPER GASTROINTESTINAL ENDOSCOPY  2008    WISDOM TOOTH EXTRACTION  1997     Family History  This patient's family history includes Arthritis in his maternal grandmother and mother; Asthma in his brother; Cancer in his sister; Depression in his mother; Diabetes in his maternal aunt and mother; Glaucoma in an other family member; Hearing Loss in his paternal grandfather; Heart Disease in his maternal grandfather and mother; High Blood Pressure in his maternal grandfather, maternal uncle, and mother; High Cholesterol in his maternal aunt, maternal uncle, and mother; Other in his mother; Thyroid Disease in his mother. Social History  Magali Weber  reports that he has never smoked. He has never used smokeless tobacco. He reports that he does not drink alcohol and does not use drugs. Discussed use, benefit, and side effects of prescribed medications. All questions answered. Patient voiced understanding. Reviewed health maintenance.       Electronically signed Dillan Magana MD on 11/9/2023 at 2:02 PM    This note has been created using the Epic electronic health record, and dictated in part

## 2023-11-15 ENCOUNTER — HOSPITAL ENCOUNTER (OUTPATIENT)
Age: 48
Discharge: HOME | End: 2023-11-17
Attending: INTERNAL MEDICINE
Payer: COMMERCIAL

## 2023-11-15 ENCOUNTER — HOSPITAL ENCOUNTER (OUTPATIENT)
Dept: NUCLEAR MEDICINE | Age: 48
Discharge: HOME | End: 2023-11-17
Attending: INTERNAL MEDICINE
Payer: COMMERCIAL

## 2023-11-15 DIAGNOSIS — R07.9 CHEST PAIN, UNSPECIFIED TYPE: ICD-10-CM

## 2023-11-15 PROCEDURE — 93017 CV STRESS TEST TRACING ONLY: CPT

## 2023-11-15 PROCEDURE — 93016 CV STRESS TEST SUPVJ ONLY: CPT | Performed by: INTERNAL MEDICINE

## 2023-11-15 PROCEDURE — 3430000000 HC RX DIAGNOSTIC RADIOPHARMACEUTICAL: Performed by: INTERNAL MEDICINE

## 2023-11-15 PROCEDURE — 78452 HT MUSCLE IMAGE SPECT MULT: CPT

## 2023-11-15 PROCEDURE — 93018 CV STRESS TEST I&R ONLY: CPT | Performed by: INTERNAL MEDICINE

## 2023-11-15 PROCEDURE — 78452 HT MUSCLE IMAGE SPECT MULT: CPT | Performed by: INTERNAL MEDICINE

## 2023-11-15 PROCEDURE — A9500 TC99M SESTAMIBI: HCPCS | Performed by: INTERNAL MEDICINE

## 2023-11-15 RX ORDER — TETRAKIS(2-METHOXYISOBUTYLISOCYANIDE)COPPER(I) TETRAFLUOROBORATE 1 MG/ML
30 INJECTION, POWDER, LYOPHILIZED, FOR SOLUTION INTRAVENOUS
Status: COMPLETED | OUTPATIENT
Start: 2023-11-15 | End: 2023-11-15

## 2023-11-15 RX ORDER — TETRAKIS(2-METHOXYISOBUTYLISOCYANIDE)COPPER(I) TETRAFLUOROBORATE 1 MG/ML
10 INJECTION, POWDER, LYOPHILIZED, FOR SOLUTION INTRAVENOUS
Status: COMPLETED | OUTPATIENT
Start: 2023-11-15 | End: 2023-11-15

## 2023-11-15 RX ADMIN — Medication 30 MILLICURIE: at 09:50

## 2023-11-15 RX ADMIN — Medication 10 MILLICURIE: at 08:45

## 2023-11-16 LAB
NUC STRESS EJECTION FRACTION: 60 %
STRESS BASELINE DIAS BP: 74 MMHG
STRESS BASELINE HR: 75 BPM
STRESS BASELINE SYS BP: 146 MMHG
STRESS ESTIMATED WORKLOAD: 8.3 METS
STRESS PEAK DIAS BP: 66 MMHG
STRESS PEAK SYS BP: 219 MMHG
STRESS PERCENT HR ACHIEVED: 102 %
STRESS POST PEAK HR: 173 BPM
STRESS RATE PRESSURE PRODUCT: NORMAL BPM*MMHG
STRESS ST DEPRESSION: 0 MM
STRESS TARGET HR: 170 BPM
TID: 0.88

## 2023-11-18 ENCOUNTER — HOSPITAL ENCOUNTER (OUTPATIENT)
Dept: GENERAL RADIOLOGY | Age: 48
End: 2023-11-18
Payer: COMMERCIAL

## 2023-11-18 ENCOUNTER — OFFICE VISIT (OUTPATIENT)
Dept: PRIMARY CARE CLINIC | Age: 48
End: 2023-11-18
Payer: COMMERCIAL

## 2023-11-18 VITALS
SYSTOLIC BLOOD PRESSURE: 128 MMHG | RESPIRATION RATE: 14 BRPM | BODY MASS INDEX: 37.38 KG/M2 | HEIGHT: 72 IN | DIASTOLIC BLOOD PRESSURE: 90 MMHG | TEMPERATURE: 98.1 F | HEART RATE: 92 BPM | WEIGHT: 276 LBS | OXYGEN SATURATION: 97 %

## 2023-11-18 DIAGNOSIS — M25.561 ACUTE PAIN OF RIGHT KNEE: ICD-10-CM

## 2023-11-18 DIAGNOSIS — M25.561 ACUTE PAIN OF RIGHT KNEE: Primary | ICD-10-CM

## 2023-11-18 PROCEDURE — 99213 OFFICE O/P EST LOW 20 MIN: CPT

## 2023-11-18 PROCEDURE — 73562 X-RAY EXAM OF KNEE 3: CPT

## 2023-11-18 PROCEDURE — 3080F DIAST BP >= 90 MM HG: CPT

## 2023-11-18 PROCEDURE — 3074F SYST BP LT 130 MM HG: CPT

## 2023-11-18 RX ORDER — ERGOCALCIFEROL 1.25 MG/1
CAPSULE ORAL
COMMUNITY

## 2023-11-18 ASSESSMENT — ENCOUNTER SYMPTOMS
RESPIRATORY NEGATIVE: 1
EYES NEGATIVE: 1
ALLERGIC/IMMUNOLOGIC NEGATIVE: 1
GASTROINTESTINAL NEGATIVE: 1

## 2023-11-18 NOTE — PROGRESS NOTES
[facility]  [address]  [address] [place] [address]  Dept: [phone]  Dept Fax: [phone]    George Longo  is a 46 y.o. male who presents today for his medical conditions/complaints as noted below. George Longo is c/o of   Chief Complaint   Patient presents with    Knee Injury     Pt was hit in the knee with a softball (hes a ). Pts knee is swollen and hes having aching/sharp pain when trying to walk on it. Pt has previous hx of meniscus repair and ACL tear. Pts pain is 6 or 7/10 when trying to walk. This happened 3 days ago. HPI:     Knee Pain   The incident occurred 3 to 5 days ago. The incident occurred at school. The injury mechanism was a direct blow. The pain is present in the right knee. The quality of the pain is described as aching. The pain is at a severity of 6/10. The pain is moderate. The pain has been Constant since onset. Associated symptoms include muscle weakness and numbness (in the knee). Pertinent negatives include no inability to bear weight. He reports no foreign bodies present. The symptoms are aggravated by movement. He has tried NSAIDs, ice, rest and acetaminophen for the symptoms. The treatment provided mild relief. Past Medical History:   Diagnosis Date    Depression     /obsessive-compulsive disorder, Dr. Estella Mars. Gastric bypass status for obesity 9/6/13    preop wt 338.2lb    GERD (gastroesophageal reflux disease)     1) EGD 06/08 with minimal esophagitis, not confirmed with biopsy. Hyperlipidemia     2% risk over 10 years on calculator December 2014    Hypertension     Hypogonadism male     1) Erectile dysfunction. 2) Gynecomastia. 3) Dr. Amrik Beard, endocrinology, [facility] evaluation 07/08. 4) MRI of head 08/06.      Impaired fasting glucose     Obesity     BMI 45, 06/08., Bariatric surg 9/13    Obstructive sleep apnea

## 2023-11-20 ENCOUNTER — OFFICE VISIT (OUTPATIENT)
Dept: CARDIOLOGY | Age: 48
End: 2023-11-20
Payer: COMMERCIAL

## 2023-11-20 VITALS
BODY MASS INDEX: 37.52 KG/M2 | DIASTOLIC BLOOD PRESSURE: 84 MMHG | HEART RATE: 76 BPM | SYSTOLIC BLOOD PRESSURE: 146 MMHG | HEIGHT: 72 IN | OXYGEN SATURATION: 96 % | WEIGHT: 277 LBS

## 2023-11-20 DIAGNOSIS — R07.9 CHEST PAIN WITH MODERATE RISK FOR CARDIAC ETIOLOGY: ICD-10-CM

## 2023-11-20 DIAGNOSIS — I10 ESSENTIAL HYPERTENSION: ICD-10-CM

## 2023-11-20 DIAGNOSIS — E78.2 MIXED HYPERLIPIDEMIA: ICD-10-CM

## 2023-11-20 DIAGNOSIS — M17.11 PRIMARY OSTEOARTHRITIS OF RIGHT KNEE: Primary | ICD-10-CM

## 2023-11-20 DIAGNOSIS — R94.31 ABNORMAL ECG: Primary | ICD-10-CM

## 2023-11-20 PROCEDURE — 3079F DIAST BP 80-89 MM HG: CPT | Performed by: INTERNAL MEDICINE

## 2023-11-20 PROCEDURE — 99204 OFFICE O/P NEW MOD 45 MIN: CPT | Performed by: INTERNAL MEDICINE

## 2023-11-20 PROCEDURE — 3077F SYST BP >= 140 MM HG: CPT | Performed by: INTERNAL MEDICINE

## 2023-11-20 NOTE — PROGRESS NOTES
this patient, please do not hesitate to call if you have any questions. Deena Henriquez DO, [facility], [facility], [facility], [address] [place] [address] [facility]  [facility]. [facility]  [id number]    This note was created with the assistance of a speech-recognition program.  Although the intention is to generate a document that actually reflects the content of the visit, no guarantees can be provided that every mistake has been identified and corrected by editing.

## 2023-11-21 ENCOUNTER — OFFICE VISIT (OUTPATIENT)
Dept: ORTHOPEDIC SURGERY | Age: 48
End: 2023-11-21
Payer: COMMERCIAL

## 2023-11-21 VITALS — RESPIRATION RATE: 16 BRPM | OXYGEN SATURATION: 98 % | WEIGHT: 287.4 LBS | BODY MASS INDEX: 38.93 KG/M2 | HEIGHT: 72 IN

## 2023-11-21 DIAGNOSIS — M17.11 PRIMARY OSTEOARTHRITIS OF RIGHT KNEE: ICD-10-CM

## 2023-11-21 DIAGNOSIS — M25.561 ACUTE PAIN OF RIGHT KNEE: Primary | ICD-10-CM

## 2023-11-21 PROCEDURE — 99213 OFFICE O/P EST LOW 20 MIN: CPT

## 2023-11-21 SDOH — HEALTH STABILITY: PHYSICAL HEALTH: ON AVERAGE, HOW MANY MINUTES DO YOU ENGAGE IN EXERCISE AT THIS LEVEL?: 0 MIN

## 2023-11-21 SDOH — HEALTH STABILITY: PHYSICAL HEALTH: ON AVERAGE, HOW MANY DAYS PER WEEK DO YOU ENGAGE IN MODERATE TO STRENUOUS EXERCISE (LIKE A BRISK WALK)?: 0 DAYS

## 2023-11-21 ASSESSMENT — ENCOUNTER SYMPTOMS: COLOR CHANGE: 0

## 2024-01-16 ENCOUNTER — OFFICE VISIT (OUTPATIENT)
Dept: INTERNAL MEDICINE | Age: 49
End: 2024-01-16
Payer: COMMERCIAL

## 2024-01-16 VITALS
RESPIRATION RATE: 16 BRPM | HEART RATE: 87 BPM | HEIGHT: 72 IN | BODY MASS INDEX: 38.74 KG/M2 | OXYGEN SATURATION: 96 % | WEIGHT: 286 LBS | DIASTOLIC BLOOD PRESSURE: 72 MMHG | SYSTOLIC BLOOD PRESSURE: 120 MMHG

## 2024-01-16 DIAGNOSIS — F33.1 MAJOR DEPRESSIVE DISORDER, RECURRENT, MODERATE (HCC): ICD-10-CM

## 2024-01-16 DIAGNOSIS — F33.0 MAJOR DEPRESSIVE DISORDER, RECURRENT, MILD (HCC): ICD-10-CM

## 2024-01-16 DIAGNOSIS — N40.1 BENIGN PROSTATIC HYPERPLASIA WITH LOWER URINARY TRACT SYMPTOMS, SYMPTOM DETAILS UNSPECIFIED: ICD-10-CM

## 2024-01-16 DIAGNOSIS — E78.2 MIXED HYPERLIPIDEMIA: ICD-10-CM

## 2024-01-16 DIAGNOSIS — R73.03 PREDIABETES: Primary | ICD-10-CM

## 2024-01-16 DIAGNOSIS — I10 HYPERTENSION, UNSPECIFIED TYPE: ICD-10-CM

## 2024-01-16 DIAGNOSIS — E66.01 SEVERE OBESITY (BMI 35.0-39.9) WITH COMORBIDITY (HCC): ICD-10-CM

## 2024-01-16 PROCEDURE — 3078F DIAST BP <80 MM HG: CPT | Performed by: INTERNAL MEDICINE

## 2024-01-16 PROCEDURE — 99214 OFFICE O/P EST MOD 30 MIN: CPT | Performed by: INTERNAL MEDICINE

## 2024-01-16 PROCEDURE — 3074F SYST BP LT 130 MM HG: CPT | Performed by: INTERNAL MEDICINE

## 2024-01-16 RX ORDER — SEMAGLUTIDE 0.68 MG/ML
INJECTION, SOLUTION SUBCUTANEOUS
Qty: 3 ML | Refills: 1 | Status: SHIPPED | OUTPATIENT
Start: 2024-01-16 | End: 2024-03-11

## 2024-01-16 RX ORDER — HYDROXYZINE PAMOATE 25 MG/1
25 CAPSULE ORAL 3 TIMES DAILY PRN
Qty: 90 CAPSULE | Refills: 0 | Status: SHIPPED | OUTPATIENT
Start: 2024-01-16 | End: 2024-02-15

## 2024-01-16 ASSESSMENT — PATIENT HEALTH QUESTIONNAIRE - PHQ9
DEPRESSION UNABLE TO ASSESS: FUNCTIONAL CAPACITY MOTIVATION LIMITS ACCURACY
SUM OF ALL RESPONSES TO PHQ QUESTIONS 1-9: 6
SUM OF ALL RESPONSES TO PHQ QUESTIONS 1-9: 6
3. TROUBLE FALLING OR STAYING ASLEEP: 2
8. MOVING OR SPEAKING SO SLOWLY THAT OTHER PEOPLE COULD HAVE NOTICED. OR THE OPPOSITE, BEING SO FIGETY OR RESTLESS THAT YOU HAVE BEEN MOVING AROUND A LOT MORE THAN USUAL: 0
7. TROUBLE CONCENTRATING ON THINGS, SUCH AS READING THE NEWSPAPER OR WATCHING TELEVISION: 0
2. FEELING DOWN, DEPRESSED OR HOPELESS: 0
SUM OF ALL RESPONSES TO PHQ QUESTIONS 1-9: 6
9. THOUGHTS THAT YOU WOULD BE BETTER OFF DEAD, OR OF HURTING YOURSELF: 0
4. FEELING TIRED OR HAVING LITTLE ENERGY: 2
1. LITTLE INTEREST OR PLEASURE IN DOING THINGS: 0
SUM OF ALL RESPONSES TO PHQ QUESTIONS 1-9: 6
5. POOR APPETITE OR OVEREATING: 2
6. FEELING BAD ABOUT YOURSELF - OR THAT YOU ARE A FAILURE OR HAVE LET YOURSELF OR YOUR FAMILY DOWN: 0
SUM OF ALL RESPONSES TO PHQ9 QUESTIONS 1 & 2: 0
10. IF YOU CHECKED OFF ANY PROBLEMS, HOW DIFFICULT HAVE THESE PROBLEMS MADE IT FOR YOU TO DO YOUR WORK, TAKE CARE OF THINGS AT HOME, OR GET ALONG WITH OTHER PEOPLE: 0

## 2024-01-17 NOTE — PROGRESS NOTES
kg/m²   Constitutional: No acute distress.  Sits in chair comfortably  Eyes: Sclerae nonicteric. No lid lag or proptosis  HENT: External ears normal. No external lesions on the nose  Neck: No gross masses. Trachea visibly midline  Respiratory: Good air entry bilaterally.  No wheezing or crackles  Cardiovascular: Normal S1-S2.  No murmurs.  No lower extremity edema  Gastrointestinal: No visible masses. No visible hernias  Skin: No abnormal rashes. No abnormal masses  Neurologic: Cranial nerves grossly intact  Psychiatric: Normal affect. Alert and oriented    Medications  Current Outpatient Medications:     hydrOXYzine pamoate (VISTARIL) 25 MG capsule, Take 1 capsule by mouth 3 times daily as needed for Anxiety, Disp: 90 capsule, Rfl: 0    Semaglutide,0.25 or 0.5MG/DOS, (OZEMPIC, 0.25 OR 0.5 MG/DOSE,) 2 MG/3ML SOPN, Inject 0.25 mg into the skin once a week for 28 days, THEN 0.5 mg once a week for 28 days., Disp: 3 mL, Rfl: 1    Vitamin D, Ergocalciferol, 99333 units CAPS, Take by mouth, Disp: , Rfl:     atorvastatin (LIPITOR) 20 MG tablet, Take 1 tablet by mouth daily, Disp: 90 tablet, Rfl: 2    tamsulosin (FLOMAX) 0.4 MG capsule, Take 1 capsule by mouth daily, Disp: 90 capsule, Rfl: 3    DULoxetine (CYMBALTA) 60 MG extended release capsule, Take 1 capsule by mouth daily, Disp: 90 capsule, Rfl: 3    valACYclovir (VALTREX) 1 g tablet, TAKE 1 TABLET BY MOUTH ONE TIME DAILY, Disp: 90 tablet, Rfl: 3    lisinopril (PRINIVIL;ZESTRIL) 20 MG tablet, Take 1 tablet by mouth daily, Disp: 90 tablet, Rfl: 3    Multiple Vitamin (MVI, CELEBRATE, CHEWABLE TABLET), Take 1 tablet by mouth 2 times daily, Disp: , Rfl:     EPINEPHrine (EPIPEN) 0.3 MG/0.3ML SOAJ injection, Inject 0.3 mLs into the muscle once for 1 dose Use as directed for allergic reaction, Disp: 0.3 mL, Rfl: 0    Allergies  Effexor [venlafaxine], Shellfish-derived products, and Morphine    Past Medical History:   Diagnosis Date    Depression     /obsessive-compulsive

## 2024-01-30 ENCOUNTER — OFFICE VISIT (OUTPATIENT)
Dept: INTERNAL MEDICINE | Age: 49
End: 2024-01-30
Payer: COMMERCIAL

## 2024-01-30 VITALS
HEART RATE: 60 BPM | HEIGHT: 72 IN | SYSTOLIC BLOOD PRESSURE: 128 MMHG | WEIGHT: 286.4 LBS | DIASTOLIC BLOOD PRESSURE: 70 MMHG | TEMPERATURE: 97.5 F | BODY MASS INDEX: 38.79 KG/M2

## 2024-01-30 DIAGNOSIS — L03.012 CELLULITIS OF FINGER OF LEFT HAND: Primary | ICD-10-CM

## 2024-01-30 PROCEDURE — 3074F SYST BP LT 130 MM HG: CPT | Performed by: NURSE PRACTITIONER

## 2024-01-30 PROCEDURE — 99213 OFFICE O/P EST LOW 20 MIN: CPT | Performed by: NURSE PRACTITIONER

## 2024-01-30 PROCEDURE — 3078F DIAST BP <80 MM HG: CPT | Performed by: NURSE PRACTITIONER

## 2024-01-30 RX ORDER — CEPHALEXIN 500 MG/1
500 CAPSULE ORAL 3 TIMES DAILY
Qty: 21 CAPSULE | Refills: 0 | Status: SHIPPED | OUTPATIENT
Start: 2024-01-30 | End: 2024-02-06

## 2024-01-30 NOTE — PROGRESS NOTES
01/30/24  Cesar Ayala  1975      Chief Complaint:   1. Cellulitis of finger of left hand        HPI:  Patient comes in for an acute visit with concerns of swelling pain increased warmth and redness to the left fourth finger.  State cut on a metal batting tea about a week and a half ago.  Denies any feeling/sensation of foreign body.  Has been using triple antibiotic ointment without any change.  States first thing in the morning was significantly red and swollen.  Tender.  Did have pus coming out of it initially.      Allergies   Allergen Reactions    Effexor [Venlafaxine] Other (See Comments)     Muscle spasms    Shellfish-Derived Products Swelling     Throat swelling    Morphine Hives and Nausea Only     nausea       Past Medical History:   Diagnosis Date    Depression     /obsessive-compulsive disorder, Dr. Walters.    Gastric bypass status for obesity 9/6/13    preop wt 338.2lb    GERD (gastroesophageal reflux disease)     1) EGD 06/08 with minimal esophagitis, not confirmed with biopsy.     Hyperlipidemia     2% risk over 10 years on calculator December 2014    Hypertension     Hypogonadism male     1) Erectile dysfunction. 2) Gynecomastia. 3) Dr. Bartholomew, endocrinology, [facility] evaluation 07/08. 4) MRI of head 08/06.     Impaired fasting glucose     Obesity     BMI 45, 06/08., Bariatric surg 9/13    Obstructive sleep apnea     11/08, CPAP 10.- noncompliant 2016    Psoriasis     Treated with topical steroids Dr Sanz   Betamethasone cream and lotion    Sensorineural hearing loss     1) Dr. Akil Ovalle evaluation, 08/06. MRI of head negative 08/06.     Thyroid nodule     right sided, fine needle aspiration 01/09 by Dr. Bartholomew negative. 1) Repeat ultrasound 06/10 stable  Dr. Bartholomew. 2) Fine needle aspiration repeat 10/11, negative.     Vitamin D deficiency     (Dr. Bartholomew).       Past Surgical History:   Procedure Laterality Date    COLONOSCOPY N/A 7/7/2022    COLONOSCOPY performed by

## 2024-02-12 ENCOUNTER — OFFICE VISIT (OUTPATIENT)
Dept: INTERNAL MEDICINE | Age: 49
End: 2024-02-12
Payer: COMMERCIAL

## 2024-02-12 VITALS
OXYGEN SATURATION: 96 % | WEIGHT: 283 LBS | HEART RATE: 64 BPM | DIASTOLIC BLOOD PRESSURE: 74 MMHG | SYSTOLIC BLOOD PRESSURE: 130 MMHG | HEIGHT: 72 IN | BODY MASS INDEX: 38.33 KG/M2 | RESPIRATION RATE: 16 BRPM

## 2024-02-12 DIAGNOSIS — N40.1 BENIGN PROSTATIC HYPERPLASIA WITH LOWER URINARY TRACT SYMPTOMS, SYMPTOM DETAILS UNSPECIFIED: ICD-10-CM

## 2024-02-12 DIAGNOSIS — F33.1 MAJOR DEPRESSIVE DISORDER, RECURRENT, MODERATE (HCC): ICD-10-CM

## 2024-02-12 DIAGNOSIS — I10 HYPERTENSION, UNSPECIFIED TYPE: ICD-10-CM

## 2024-02-12 DIAGNOSIS — E11.69 TYPE 2 DIABETES MELLITUS WITH OTHER SPECIFIED COMPLICATION, UNSPECIFIED WHETHER LONG TERM INSULIN USE (HCC): Primary | ICD-10-CM

## 2024-02-12 DIAGNOSIS — E78.2 MIXED HYPERLIPIDEMIA: ICD-10-CM

## 2024-02-12 PROCEDURE — 3075F SYST BP GE 130 - 139MM HG: CPT | Performed by: INTERNAL MEDICINE

## 2024-02-12 PROCEDURE — 99214 OFFICE O/P EST MOD 30 MIN: CPT | Performed by: INTERNAL MEDICINE

## 2024-02-12 PROCEDURE — 3078F DIAST BP <80 MM HG: CPT | Performed by: INTERNAL MEDICINE

## 2024-02-12 RX ORDER — BUSPIRONE HYDROCHLORIDE 5 MG/1
5 TABLET ORAL 2 TIMES DAILY
Qty: 60 TABLET | Refills: 1 | Status: SHIPPED | OUTPATIENT
Start: 2024-02-12 | End: 2024-04-12

## 2024-02-12 RX ORDER — TIRZEPATIDE 2.5 MG/.5ML
2.5 INJECTION, SOLUTION SUBCUTANEOUS WEEKLY
Qty: 4 EACH | Refills: 2 | Status: SHIPPED | OUTPATIENT
Start: 2024-02-12 | End: 2024-02-12

## 2024-02-12 RX ORDER — TIRZEPATIDE 2.5 MG/.5ML
2.5 INJECTION, SOLUTION SUBCUTANEOUS WEEKLY
Qty: 4 EACH | Refills: 2 | Status: SHIPPED | OUTPATIENT
Start: 2024-02-12

## 2024-02-12 NOTE — PROGRESS NOTES
[facility] INTERNAL MEDICINE    Visit Date:  2/12/2024  Patient:  Cesar Ayala  YOB: 1975    Assessment & Plan     Type 2 diabetes: Had 2 fasting blood sugars higher than 125.  Will order Mounjaro.  Reassess next visit.    Anxiety: Continue Cymbalta.  Will prescribe BuSpar.  Will discontinue Vistaril reassess next visit.    Depression: Continue Cymbalta.  Reassess next visit.    Hyperlipidemia: Continue Lipitor.  We will continue to monitor.    Hypertension: Blood pressure controlled.  Continue lisinopril.    BPH: Continue Flomax.  Asymptomatic at this time         Diagnosis Orders   1. Type 2 diabetes mellitus with other specified complication, unspecified whether long term insulin use (HCC)  Tirzepatide (MOUNJARO) 2.5 MG/0.5ML SOPN SC injection    DISCONTINUED: Tirzepatide (MOUNJARO) 2.5 MG/0.5ML SOPN SC injection      2. Mixed hyperlipidemia        3. Hypertension, unspecified type        4. Benign prostatic hyperplasia with lower urinary tract symptoms, symptom details unspecified        5. Major depressive disorder, recurrent, moderate (HCC)            Chief Complaint  1 Month Follow-Up and Finger Pain (Finger pain )    History of Present Illness   Presents to follow-up.  Last visit, he was started on Vistaril for anxiety.  Says that it caused him to feel a bit tired.  Asks whether there is other options.  He already uses Cymbalta, so I advised that we can try BuSpar.  He agrees to try it.  Moreover, he reports that he has a cut in his finger that caused him an infection.  He already took antibiotics and now seems to be doing better.  No fever.  I advised that he can try antibiotic cream and he agrees.    Moreover, has a history of prediabetes, he is interested in trying Ozempic.  I advised that we can prescribe it and reassess afterwards.    Objective  /74 (Site: Left Upper Arm, Position: Sitting, Cuff Size: Medium Adult)   Pulse 64   Resp 16   Ht 1.829 m (6')   Wt

## 2024-04-01 ENCOUNTER — OFFICE VISIT (OUTPATIENT)
Dept: INTERNAL MEDICINE | Age: 49
End: 2024-04-01
Payer: COMMERCIAL

## 2024-04-01 VITALS
BODY MASS INDEX: 35.76 KG/M2 | DIASTOLIC BLOOD PRESSURE: 78 MMHG | OXYGEN SATURATION: 98 % | SYSTOLIC BLOOD PRESSURE: 124 MMHG | WEIGHT: 264 LBS | HEART RATE: 68 BPM | RESPIRATION RATE: 16 BRPM | HEIGHT: 72 IN

## 2024-04-01 DIAGNOSIS — N40.1 BENIGN PROSTATIC HYPERPLASIA WITH LOWER URINARY TRACT SYMPTOMS, SYMPTOM DETAILS UNSPECIFIED: ICD-10-CM

## 2024-04-01 DIAGNOSIS — E11.69 TYPE 2 DIABETES MELLITUS WITH OTHER SPECIFIED COMPLICATION, UNSPECIFIED WHETHER LONG TERM INSULIN USE (HCC): Primary | ICD-10-CM

## 2024-04-01 DIAGNOSIS — E78.2 MIXED HYPERLIPIDEMIA: ICD-10-CM

## 2024-04-01 DIAGNOSIS — I10 HYPERTENSION, UNSPECIFIED TYPE: ICD-10-CM

## 2024-04-01 PROCEDURE — 99214 OFFICE O/P EST MOD 30 MIN: CPT | Performed by: INTERNAL MEDICINE

## 2024-04-01 PROCEDURE — 3078F DIAST BP <80 MM HG: CPT | Performed by: INTERNAL MEDICINE

## 2024-04-01 PROCEDURE — 3074F SYST BP LT 130 MM HG: CPT | Performed by: INTERNAL MEDICINE

## 2024-04-01 RX ORDER — TIRZEPATIDE 5 MG/.5ML
5 INJECTION, SOLUTION SUBCUTANEOUS WEEKLY
Qty: 4 ADJUSTABLE DOSE PRE-FILLED PEN SYRINGE | Refills: 3 | Status: SHIPPED | OUTPATIENT
Start: 2024-04-01

## 2024-04-01 NOTE — PROGRESS NOTES
[facility] INTERNAL MEDICINE    Visit Date:  4/1/2024  Patient:  Cesar Ayala  YOB: 1975    Assessment & Plan     Type 2 diabetes: We will increase Mounjaro to 5 mg weekly.  Will continue to monitor.    Anxiety: Continue Cymbalta, and BuSpar.  Stable at this time.    Depression: Continue Cymbalta.  Reassess next visit.    Hyperlipidemia: Continue Lipitor.  We will continue to monitor.    Hypertension: Blood pressure controlled.  Continue lisinopril.    BPH: Continue Flomax.  Asymptomatic at this time         Diagnosis Orders   1. Type 2 diabetes mellitus with other specified complication, unspecified whether long term insulin use (HCC)  Tirzepatide (MOUNJARO) 5 MG/0.5ML SOPN SC injection    Hemoglobin A1C      2. Mixed hyperlipidemia  Lipid Panel      3. Hypertension, unspecified type  CBC with Auto Differential    Comprehensive Metabolic Panel      4. Benign prostatic hyperplasia with lower urinary tract symptoms, symptom details unspecified  PSA, Diagnostic          Chief Complaint  Medication Adjustment    History of Present Illness   Presents to follow-up, he was started last visit on Mounjaro, and says that he has switched his diet to a keto diet and seems to have lost some weight.  Otherwise doing okay at this time.  Denies nausea, vomiting.  Does not seem to have any side effects at this time.  He says that he is okay with increasing the dose to the next dose up.    Objective  /78 (Site: Left Upper Arm, Position: Sitting, Cuff Size: Medium Adult)   Pulse 68   Resp 16   Ht 1.829 m (6')   Wt 119.7 kg (264 lb)   SpO2 98%   BMI 35.80 kg/m²   Constitutional: No acute distress.  Sits in chair comfortably  Eyes: Sclerae nonicteric. No lid lag or proptosis  HENT: External ears normal. No external lesions on the nose  Neck: No gross masses. Trachea visibly midline  Respiratory: Good air entry bilaterally.  No wheezing or crackles  Cardiovascular: Normal S1-S2.  No murmurs.

## 2024-06-29 DIAGNOSIS — E11.69 TYPE 2 DIABETES MELLITUS WITH OTHER SPECIFIED COMPLICATION, UNSPECIFIED WHETHER LONG TERM INSULIN USE (HCC): ICD-10-CM

## 2024-07-01 RX ORDER — VALACYCLOVIR HYDROCHLORIDE 1 G/1
TABLET, FILM COATED ORAL
Qty: 90 TABLET | Refills: 3 | Status: SHIPPED | OUTPATIENT
Start: 2024-07-01

## 2024-07-01 RX ORDER — ATORVASTATIN CALCIUM 20 MG/1
20 TABLET, FILM COATED ORAL DAILY
Qty: 90 TABLET | Refills: 3 | Status: SHIPPED | OUTPATIENT
Start: 2024-07-01

## 2024-07-01 RX ORDER — TIRZEPATIDE 5 MG/.5ML
5 INJECTION, SOLUTION SUBCUTANEOUS WEEKLY
Qty: 6 ML | Refills: 1 | Status: SHIPPED | OUTPATIENT
Start: 2024-07-01

## 2024-07-01 RX ORDER — LISINOPRIL 20 MG/1
20 TABLET ORAL DAILY
Qty: 90 TABLET | Refills: 3 | Status: SHIPPED | OUTPATIENT
Start: 2024-07-01

## 2024-07-08 ENCOUNTER — OFFICE VISIT (OUTPATIENT)
Dept: INTERNAL MEDICINE | Age: 49
End: 2024-07-08
Payer: COMMERCIAL

## 2024-07-08 ENCOUNTER — HOSPITAL ENCOUNTER (OUTPATIENT)
Age: 49
Discharge: HOME | End: 2024-07-08
Payer: COMMERCIAL

## 2024-07-08 VITALS
HEART RATE: 96 BPM | HEIGHT: 72 IN | WEIGHT: 239 LBS | SYSTOLIC BLOOD PRESSURE: 120 MMHG | RESPIRATION RATE: 16 BRPM | OXYGEN SATURATION: 98 % | BODY MASS INDEX: 32.37 KG/M2 | DIASTOLIC BLOOD PRESSURE: 68 MMHG

## 2024-07-08 DIAGNOSIS — N40.1 BENIGN PROSTATIC HYPERPLASIA WITH LOWER URINARY TRACT SYMPTOMS, SYMPTOM DETAILS UNSPECIFIED: ICD-10-CM

## 2024-07-08 DIAGNOSIS — E78.2 MIXED HYPERLIPIDEMIA: ICD-10-CM

## 2024-07-08 DIAGNOSIS — F33.1 MAJOR DEPRESSIVE DISORDER, RECURRENT, MODERATE (HCC): ICD-10-CM

## 2024-07-08 DIAGNOSIS — I10 HYPERTENSION, UNSPECIFIED TYPE: ICD-10-CM

## 2024-07-08 DIAGNOSIS — E11.69 TYPE 2 DIABETES MELLITUS WITH OTHER SPECIFIED COMPLICATION, UNSPECIFIED WHETHER LONG TERM INSULIN USE (HCC): ICD-10-CM

## 2024-07-08 DIAGNOSIS — E11.69 TYPE 2 DIABETES MELLITUS WITH OTHER SPECIFIED COMPLICATION, UNSPECIFIED WHETHER LONG TERM INSULIN USE (HCC): Primary | ICD-10-CM

## 2024-07-08 LAB
ALBUMIN SERPL-MCNC: 4.3 G/DL (ref 3.5–5.2)
ALBUMIN/GLOB SERPL: 1.7 {RATIO} (ref 1–2.5)
ALP SERPL-CCNC: 78 U/L (ref 40–129)
ALT SERPL-CCNC: 23 U/L (ref 5–41)
ANION GAP SERPL CALCULATED.3IONS-SCNC: 10 MMOL/L (ref 9–17)
AST SERPL-CCNC: 25 U/L
BASOPHILS # BLD: 0.03 K/UL (ref 0–0.2)
BASOPHILS NFR BLD: 1 % (ref 0–2)
BILIRUB SERPL-MCNC: 0.5 MG/DL (ref 0.3–1.2)
BUN SERPL-MCNC: 14 MG/DL (ref 6–20)
BUN/CREAT SERPL: 16 (ref 9–20)
CALCIUM SERPL-MCNC: 9.3 MG/DL (ref 8.6–10.4)
CHLORIDE SERPL-SCNC: 103 MMOL/L (ref 98–107)
CHOLEST SERPL-MCNC: 149 MG/DL (ref 0–199)
CHOLESTEROL/HDL RATIO: 3
CO2 SERPL-SCNC: 28 MMOL/L (ref 20–31)
CREAT SERPL-MCNC: 0.9 MG/DL (ref 0.7–1.2)
EOSINOPHIL # BLD: 0.07 K/UL (ref 0–0.44)
EOSINOPHILS RELATIVE PERCENT: 2 % (ref 1–4)
ERYTHROCYTE [DISTWIDTH] IN BLOOD BY AUTOMATED COUNT: 14.4 % (ref 11.8–14.4)
EST. AVERAGE GLUCOSE BLD GHB EST-MCNC: 114 MG/DL
GFR, ESTIMATED: >90 ML/MIN/1.73M2
GLUCOSE SERPL-MCNC: 119 MG/DL (ref 70–99)
HBA1C MFR BLD: 5.6 % (ref 4–6)
HCT VFR BLD AUTO: 39.8 % (ref 40.7–50.3)
HDLC SERPL-MCNC: 58 MG/DL
HGB BLD-MCNC: 13.3 G/DL (ref 13–17)
IMM GRANULOCYTES # BLD AUTO: <0.03 K/UL (ref 0–0.3)
IMM GRANULOCYTES NFR BLD: 0 %
LDLC SERPL CALC-MCNC: 78 MG/DL (ref 0–100)
LYMPHOCYTES NFR BLD: 1.26 K/UL (ref 1.1–3.7)
LYMPHOCYTES RELATIVE PERCENT: 32 % (ref 24–43)
MCH RBC QN AUTO: 30.1 PG (ref 25.2–33.5)
MCHC RBC AUTO-ENTMCNC: 33.4 G/DL (ref 25.2–33.5)
MCV RBC AUTO: 90 FL (ref 82.6–102.9)
MONOCYTES NFR BLD: 0.34 K/UL (ref 0.1–1.2)
MONOCYTES NFR BLD: 9 % (ref 3–12)
NEUTROPHILS NFR BLD: 56 % (ref 36–65)
NEUTS SEG NFR BLD: 2.29 K/UL (ref 1.5–8.1)
NRBC BLD-RTO: 0 PER 100 WBC
PLATELET # BLD AUTO: 191 K/UL (ref 138–453)
PMV BLD AUTO: 10.2 FL (ref 8.1–13.5)
POTASSIUM SERPL-SCNC: 4.7 MMOL/L (ref 3.7–5.3)
PROT SERPL-MCNC: 6.9 G/DL (ref 6.4–8.3)
PSA SERPL-MCNC: 0.5 NG/ML (ref 0–4)
RBC # BLD AUTO: 4.42 M/UL (ref 4.21–5.77)
SODIUM SERPL-SCNC: 141 MMOL/L (ref 135–144)
TRIGL SERPL-MCNC: 68 MG/DL
VLDLC SERPL CALC-MCNC: 14 MG/DL
WBC OTHER # BLD: 4 K/UL (ref 3.5–11.3)

## 2024-07-08 PROCEDURE — 3074F SYST BP LT 130 MM HG: CPT | Performed by: INTERNAL MEDICINE

## 2024-07-08 PROCEDURE — 84153 ASSAY OF PSA TOTAL: CPT

## 2024-07-08 PROCEDURE — 80061 LIPID PANEL: CPT

## 2024-07-08 PROCEDURE — 3078F DIAST BP <80 MM HG: CPT | Performed by: INTERNAL MEDICINE

## 2024-07-08 PROCEDURE — 99214 OFFICE O/P EST MOD 30 MIN: CPT | Performed by: INTERNAL MEDICINE

## 2024-07-08 PROCEDURE — 83036 HEMOGLOBIN GLYCOSYLATED A1C: CPT

## 2024-07-08 PROCEDURE — 85025 COMPLETE CBC W/AUTO DIFF WBC: CPT

## 2024-07-08 PROCEDURE — 80053 COMPREHEN METABOLIC PANEL: CPT

## 2024-07-08 PROCEDURE — 3044F HG A1C LEVEL LT 7.0%: CPT | Performed by: INTERNAL MEDICINE

## 2024-07-08 PROCEDURE — 36415 COLL VENOUS BLD VENIPUNCTURE: CPT

## 2024-07-08 RX ORDER — TIRZEPATIDE 7.5 MG/.5ML
7.5 INJECTION, SOLUTION SUBCUTANEOUS WEEKLY
Qty: 6 ML | Refills: 2 | Status: SHIPPED | OUTPATIENT
Start: 2024-07-08

## 2024-07-08 NOTE — PROGRESS NOTES
[facility] INTERNAL MEDICINE    Visit Date:  7/8/2024  Patient:  Cesar Ayala  YOB: 1975    Assessment & Plan     Type 2 diabetes: We will increase Mounjaro to 7.5 mg weekly.  Will continue to monitor.    Anxiety: Continue Cymbalta, and BuSpar.  Stable at this time.    Depression: Continue Cymbalta.  Reassess next visit.    Hyperlipidemia: Continue Lipitor.  We will continue to monitor.    Hypertension: Blood pressure controlled.  Continue lisinopril.    BPH: Continue Flomax.  Asymptomatic at this time         Diagnosis Orders   1. Type 2 diabetes mellitus with other specified complication, unspecified whether long term insulin use (HCC)  HM DIABETES FOOT EXAM      2. Mixed hyperlipidemia        3. Hypertension, unspecified type        4. Benign prostatic hyperplasia with lower urinary tract symptoms, symptom details unspecified        5. Major depressive disorder, recurrent, moderate (HCC)            Chief Complaint  3 Month Follow-Up    History of Present Illness   Presents to follow-up.  Has been using Mounjaro for type 2 diabetes, but it also helped him lose weight.  Says that he is doing okay at this time.  No side effects.  Denies fever, chills, nausea, vomiting, diarrhea, constipation.  He says that his weight loss seem to plateau, I advised that we can increase the dose to 7.5 mg and he agrees.    Objective  /68 (Site: Left Upper Arm, Position: Sitting, Cuff Size: Medium Adult)   Pulse 96   Resp 16   Ht 1.829 m (6')   Wt 108.4 kg (239 lb)   SpO2 98%   BMI 32.41 kg/m²   Constitutional: No acute distress.  Sits in chair comfortably  Eyes: Sclerae nonicteric. No lid lag or proptosis  HENT: External ears normal. No external lesions on the nose  Neck: No gross masses. Trachea visibly midline  Respiratory: Good air entry bilaterally.  No wheezing or crackles  Cardiovascular: Normal S1-S2.  No murmurs.  No lower extremity edema  Gastrointestinal: No visible masses. No

## 2024-09-17 RX ORDER — DULOXETIN HYDROCHLORIDE 60 MG/1
60 CAPSULE, DELAYED RELEASE ORAL DAILY
Qty: 90 CAPSULE | Refills: 3 | Status: SHIPPED | OUTPATIENT
Start: 2024-09-17

## 2024-09-17 RX ORDER — TAMSULOSIN HYDROCHLORIDE 0.4 MG/1
0.4 CAPSULE ORAL DAILY
Qty: 90 CAPSULE | Refills: 3 | Status: SHIPPED | OUTPATIENT
Start: 2024-09-17

## 2024-10-08 ENCOUNTER — OFFICE VISIT (OUTPATIENT)
Dept: INTERNAL MEDICINE | Age: 49
End: 2024-10-08
Payer: COMMERCIAL

## 2024-10-08 VITALS
WEIGHT: 239 LBS | HEART RATE: 78 BPM | RESPIRATION RATE: 16 BRPM | SYSTOLIC BLOOD PRESSURE: 128 MMHG | HEIGHT: 72 IN | OXYGEN SATURATION: 98 % | BODY MASS INDEX: 32.37 KG/M2 | DIASTOLIC BLOOD PRESSURE: 78 MMHG

## 2024-10-08 DIAGNOSIS — E78.2 MIXED HYPERLIPIDEMIA: Primary | ICD-10-CM

## 2024-10-08 DIAGNOSIS — N40.1 BENIGN PROSTATIC HYPERPLASIA WITH LOWER URINARY TRACT SYMPTOMS, SYMPTOM DETAILS UNSPECIFIED: ICD-10-CM

## 2024-10-08 DIAGNOSIS — F33.1 MAJOR DEPRESSIVE DISORDER, RECURRENT, MODERATE (HCC): ICD-10-CM

## 2024-10-08 DIAGNOSIS — E11.69 TYPE 2 DIABETES MELLITUS WITH OTHER SPECIFIED COMPLICATION, UNSPECIFIED WHETHER LONG TERM INSULIN USE (HCC): ICD-10-CM

## 2024-10-08 DIAGNOSIS — I10 HYPERTENSION, UNSPECIFIED TYPE: ICD-10-CM

## 2024-10-08 PROCEDURE — 3078F DIAST BP <80 MM HG: CPT | Performed by: INTERNAL MEDICINE

## 2024-10-08 PROCEDURE — 99214 OFFICE O/P EST MOD 30 MIN: CPT | Performed by: INTERNAL MEDICINE

## 2024-10-08 PROCEDURE — 3074F SYST BP LT 130 MM HG: CPT | Performed by: INTERNAL MEDICINE

## 2024-10-08 PROCEDURE — 3044F HG A1C LEVEL LT 7.0%: CPT | Performed by: INTERNAL MEDICINE

## 2024-10-08 RX ORDER — TIRZEPATIDE 10 MG/.5ML
10 INJECTION, SOLUTION SUBCUTANEOUS WEEKLY
Qty: 6 ML | Refills: 3 | Status: SHIPPED | OUTPATIENT
Start: 2024-10-08

## 2024-10-08 RX ORDER — LISINOPRIL 5 MG/1
5 TABLET ORAL DAILY
Qty: 90 TABLET | Refills: 1 | Status: SHIPPED | OUTPATIENT
Start: 2024-10-08

## 2024-10-08 SDOH — ECONOMIC STABILITY: INCOME INSECURITY: HOW HARD IS IT FOR YOU TO PAY FOR THE VERY BASICS LIKE FOOD, HOUSING, MEDICAL CARE, AND HEATING?: NOT VERY HARD

## 2024-10-08 SDOH — ECONOMIC STABILITY: FOOD INSECURITY: WITHIN THE PAST 12 MONTHS, YOU WORRIED THAT YOUR FOOD WOULD RUN OUT BEFORE YOU GOT MONEY TO BUY MORE.: NEVER TRUE

## 2024-10-08 SDOH — ECONOMIC STABILITY: FOOD INSECURITY: WITHIN THE PAST 12 MONTHS, THE FOOD YOU BOUGHT JUST DIDN'T LAST AND YOU DIDN'T HAVE MONEY TO GET MORE.: NEVER TRUE

## 2024-10-08 NOTE — PROGRESS NOTES
[facility] INTERNAL MEDICINE    Visit Date:  10/8/2024  Patient:  Cesar Ayala  YOB: 1975    Assessment & Plan     Hypertension: Will decrease lisinopril to 5 mg daily.  Reassess next visit    Type 2 diabetes: We will increase Mounjaro to 10 mg weekly.  Will continue to monitor.    Anxiety: Continue Cymbalta, and BuSpar.  Stable at this time.    Depression: Continue Cymbalta.  Reassess next visit.    Hyperlipidemia: Continue Lipitor.  We will continue to monitor.    BPH: Continue Flomax.  Asymptomatic at this time         Diagnosis Orders   1. Mixed hyperlipidemia  CBC with Auto Differential    Comprehensive Metabolic Panel    Lipid Panel      2. Type 2 diabetes mellitus with other specified complication, unspecified whether long term insulin use (HCC)  Hemoglobin A1C      3. Hypertension, unspecified type        4. Benign prostatic hyperplasia with lower urinary tract symptoms, symptom details unspecified        5. Major depressive disorder, recurrent, moderate (HCC)            Chief Complaint  Dizziness (Started about 2 weeks ago, the dizziness is noticed when sitting to  standing for changing positions , decrease bp medications )    History of Present Illness   Presents to follow-up.  It seems that  he has been experiencing lightheadedness, and it seems that his blood pressure has been dropping a bit, which might be attributed to his weight loss.  He has been using lisinopril 20 mg, but says that he has been cutting it down to 10 mg.  He asks whether he can go on 5 mg.  I advised that we can try and reassess and he agrees.    Objective  /78 (Site: Left Upper Arm, Position: Sitting, Cuff Size: Medium Adult)   Pulse 78   Resp 16   Ht 1.829 m (6')   Wt 108.4 kg (239 lb)   SpO2 98%   BMI 32.41 kg/m²   Constitutional: No acute distress.  Sits in chair comfortably  Eyes: Sclerae nonicteric. No lid lag or proptosis  HENT: External ears normal. No external lesions on the

## 2025-01-10 DIAGNOSIS — E11.69 TYPE 2 DIABETES MELLITUS WITH OTHER SPECIFIED COMPLICATION, UNSPECIFIED WHETHER LONG TERM INSULIN USE (HCC): Primary | ICD-10-CM

## 2025-01-30 RX ORDER — DESVENLAFAXINE 50 MG/1
50 TABLET, FILM COATED, EXTENDED RELEASE ORAL DAILY
Qty: 30 TABLET | Refills: 3 | Status: SHIPPED | OUTPATIENT
Start: 2025-01-30

## 2025-03-12 ENCOUNTER — PATIENT MESSAGE (OUTPATIENT)
Dept: INTERNAL MEDICINE | Age: 50
End: 2025-03-12

## 2025-03-17 RX ORDER — DESVENLAFAXINE 50 MG/1
TABLET, FILM COATED, EXTENDED RELEASE ORAL
Refills: 0 | OUTPATIENT
Start: 2025-03-17

## 2025-03-17 RX ORDER — LISINOPRIL 5 MG/1
5 TABLET ORAL DAILY
Qty: 90 TABLET | Refills: 0 | Status: SHIPPED | OUTPATIENT
Start: 2025-03-17

## 2025-03-17 NOTE — TELEPHONE ENCOUNTER
Cesar called requesting a refill of the below medication which has been pended for you:     Requested Prescriptions     Pending Prescriptions Disp Refills    lisinopril (PRINIVIL;ZESTRIL) 5 MG tablet [Pharmacy Med Name: LISINOPRIL TABS 5MG] 90 tablet 3     Sig: TAKE 1 TABLET DAILY       Last Appointment Date: 10/8/2024  Next Appointment Date: 3/16/2025    Allergies   Allergen Reactions    Effexor [Venlafaxine] Other (See Comments)     Muscle spasms    Shellfish-Derived Products Swelling     Throat swelling    Morphine Hives and Nausea Only     nausea

## 2025-03-24 RX ORDER — DESVENLAFAXINE 50 MG/1
50 TABLET, FILM COATED, EXTENDED RELEASE ORAL DAILY
Qty: 90 TABLET | Refills: 1 | Status: SHIPPED | OUTPATIENT
Start: 2025-03-24

## 2025-04-10 ENCOUNTER — HOSPITAL ENCOUNTER (OUTPATIENT)
Age: 50
Discharge: HOME | End: 2025-04-10
Payer: COMMERCIAL

## 2025-04-10 ENCOUNTER — OFFICE VISIT (OUTPATIENT)
Dept: INTERNAL MEDICINE | Age: 50
End: 2025-04-10

## 2025-04-10 VITALS
DIASTOLIC BLOOD PRESSURE: 78 MMHG | HEART RATE: 78 BPM | OXYGEN SATURATION: 92 % | SYSTOLIC BLOOD PRESSURE: 132 MMHG | HEIGHT: 72 IN | BODY MASS INDEX: 31.56 KG/M2 | WEIGHT: 233 LBS | RESPIRATION RATE: 16 BRPM

## 2025-04-10 DIAGNOSIS — E78.2 MIXED HYPERLIPIDEMIA: ICD-10-CM

## 2025-04-10 DIAGNOSIS — F33.1 MAJOR DEPRESSIVE DISORDER, RECURRENT, MODERATE (HCC): ICD-10-CM

## 2025-04-10 DIAGNOSIS — E11.69 TYPE 2 DIABETES MELLITUS WITH OTHER SPECIFIED COMPLICATION, UNSPECIFIED WHETHER LONG TERM INSULIN USE (HCC): ICD-10-CM

## 2025-04-10 DIAGNOSIS — E11.69 TYPE 2 DIABETES MELLITUS WITH OTHER SPECIFIED COMPLICATION, UNSPECIFIED WHETHER LONG TERM INSULIN USE (HCC): Primary | ICD-10-CM

## 2025-04-10 LAB
25(OH)D3 SERPL-MCNC: 52.8 NG/ML (ref 30–100)
ALBUMIN SERPL-MCNC: 4.6 G/DL (ref 3.5–5.2)
ALBUMIN/GLOB SERPL: 1.8 {RATIO} (ref 1–2.5)
ALP SERPL-CCNC: 77 U/L (ref 40–129)
ALT SERPL-CCNC: 27 U/L (ref 10–50)
ANION GAP SERPL CALCULATED.3IONS-SCNC: 10 MMOL/L (ref 9–16)
AST SERPL-CCNC: 26 U/L (ref 10–50)
BASOPHILS # BLD: <0.03 K/UL (ref 0–0.2)
BASOPHILS NFR BLD: 0 % (ref 0–2)
BILIRUB SERPL-MCNC: 0.6 MG/DL (ref 0–1.2)
BUN SERPL-MCNC: 19 MG/DL (ref 6–20)
BUN/CREAT SERPL: 19 (ref 9–20)
CALCIUM SERPL-MCNC: 9.5 MG/DL (ref 8.6–10.4)
CHLORIDE SERPL-SCNC: 102 MMOL/L (ref 98–107)
CHOLEST SERPL-MCNC: 163 MG/DL (ref 0–199)
CHOLESTEROL/HDL RATIO: 2.9
CO2 SERPL-SCNC: 28 MMOL/L (ref 20–31)
CREAT SERPL-MCNC: 1 MG/DL (ref 0.7–1.2)
CREAT UR-MCNC: 312 MG/DL (ref 39–259)
EOSINOPHIL # BLD: 0.06 K/UL (ref 0–0.44)
EOSINOPHILS RELATIVE PERCENT: 1 % (ref 1–4)
ERYTHROCYTE [DISTWIDTH] IN BLOOD BY AUTOMATED COUNT: 12.9 % (ref 11.8–14.4)
EST. AVERAGE GLUCOSE BLD GHB EST-MCNC: 108 MG/DL
GFR, ESTIMATED: >90 ML/MIN/1.73M2
GLUCOSE SERPL-MCNC: 98 MG/DL (ref 74–99)
HBA1C MFR BLD: 5.4 % (ref 4–6)
HCT VFR BLD AUTO: 43 % (ref 40.7–50.3)
HDLC SERPL-MCNC: 56 MG/DL
HGB BLD-MCNC: 14.3 G/DL (ref 13–17)
IMM GRANULOCYTES # BLD AUTO: <0.03 K/UL (ref 0–0.3)
IMM GRANULOCYTES NFR BLD: 0 %
LDLC SERPL CALC-MCNC: 97 MG/DL (ref 0–100)
LYMPHOCYTES NFR BLD: 1.95 K/UL (ref 1.1–3.7)
LYMPHOCYTES RELATIVE PERCENT: 42 % (ref 24–43)
MCH RBC QN AUTO: 30.3 PG (ref 25.2–33.5)
MCHC RBC AUTO-ENTMCNC: 33.3 G/DL (ref 25.2–33.5)
MCV RBC AUTO: 91.1 FL (ref 82.6–102.9)
MICROALBUMIN UR-MCNC: 21 MG/L (ref 0–20)
MICROALBUMIN/CREAT UR-RTO: 7 MCG/MG CREAT (ref 0–17)
MONOCYTES NFR BLD: 0.34 K/UL (ref 0.1–1.2)
MONOCYTES NFR BLD: 7 % (ref 3–12)
NEUTROPHILS NFR BLD: 50 % (ref 36–65)
NEUTS SEG NFR BLD: 2.27 K/UL (ref 1.5–8.1)
NRBC BLD-RTO: 0 PER 100 WBC
PLATELET # BLD AUTO: 210 K/UL (ref 138–453)
PMV BLD AUTO: 10 FL (ref 8.1–13.5)
POTASSIUM SERPL-SCNC: 4.7 MMOL/L (ref 3.7–5.3)
PROT SERPL-MCNC: 7.2 G/DL (ref 6.6–8.7)
RBC # BLD AUTO: 4.72 M/UL (ref 4.21–5.77)
SODIUM SERPL-SCNC: 140 MMOL/L (ref 136–145)
T4 FREE SERPL-MCNC: 1.1 NG/DL (ref 0.9–1.7)
TRIGL SERPL-MCNC: 49 MG/DL
TSH SERPL DL<=0.05 MIU/L-ACNC: 0.86 UIU/ML (ref 0.27–4.2)
VLDLC SERPL CALC-MCNC: 10 MG/DL (ref 1–30)
WBC OTHER # BLD: 4.6 K/UL (ref 3.5–11.3)

## 2025-04-10 PROCEDURE — 85025 COMPLETE CBC W/AUTO DIFF WBC: CPT

## 2025-04-10 PROCEDURE — 82306 VITAMIN D 25 HYDROXY: CPT

## 2025-04-10 PROCEDURE — 84439 ASSAY OF FREE THYROXINE: CPT

## 2025-04-10 PROCEDURE — 84443 ASSAY THYROID STIM HORMONE: CPT

## 2025-04-10 PROCEDURE — 36415 COLL VENOUS BLD VENIPUNCTURE: CPT

## 2025-04-10 PROCEDURE — 80061 LIPID PANEL: CPT

## 2025-04-10 PROCEDURE — 82043 UR ALBUMIN QUANTITATIVE: CPT

## 2025-04-10 PROCEDURE — 83036 HEMOGLOBIN GLYCOSYLATED A1C: CPT

## 2025-04-10 PROCEDURE — 80053 COMPREHEN METABOLIC PANEL: CPT

## 2025-04-10 PROCEDURE — 82570 ASSAY OF URINE CREATININE: CPT

## 2025-04-10 RX ORDER — DESVENLAFAXINE 100 MG/1
100 TABLET, EXTENDED RELEASE ORAL DAILY
Qty: 90 TABLET | Refills: 1 | Status: SHIPPED | OUTPATIENT
Start: 2025-04-10

## 2025-04-10 ASSESSMENT — PATIENT HEALTH QUESTIONNAIRE - PHQ9
2. FEELING DOWN, DEPRESSED OR HOPELESS: NOT AT ALL
10. IF YOU CHECKED OFF ANY PROBLEMS, HOW DIFFICULT HAVE THESE PROBLEMS MADE IT FOR YOU TO DO YOUR WORK, TAKE CARE OF THINGS AT HOME, OR GET ALONG WITH OTHER PEOPLE: NOT DIFFICULT AT ALL
DEPRESSION UNABLE TO ASSESS: FUNCTIONAL CAPACITY MOTIVATION LIMITS ACCURACY
4. FEELING TIRED OR HAVING LITTLE ENERGY: SEVERAL DAYS
SUM OF ALL RESPONSES TO PHQ QUESTIONS 1-9: 3
1. LITTLE INTEREST OR PLEASURE IN DOING THINGS: NOT AT ALL
8. MOVING OR SPEAKING SO SLOWLY THAT OTHER PEOPLE COULD HAVE NOTICED. OR THE OPPOSITE, BEING SO FIGETY OR RESTLESS THAT YOU HAVE BEEN MOVING AROUND A LOT MORE THAN USUAL: NOT AT ALL
3. TROUBLE FALLING OR STAYING ASLEEP: SEVERAL DAYS
9. THOUGHTS THAT YOU WOULD BE BETTER OFF DEAD, OR OF HURTING YOURSELF: NOT AT ALL
7. TROUBLE CONCENTRATING ON THINGS, SUCH AS READING THE NEWSPAPER OR WATCHING TELEVISION: NOT AT ALL
SUM OF ALL RESPONSES TO PHQ QUESTIONS 1-9: 3
5. POOR APPETITE OR OVEREATING: SEVERAL DAYS
6. FEELING BAD ABOUT YOURSELF - OR THAT YOU ARE A FAILURE OR HAVE LET YOURSELF OR YOUR FAMILY DOWN: NOT AT ALL

## 2025-04-10 NOTE — PROGRESS NOTES
[facility] INTERNAL MEDICINE    Visit Date:  4/10/2025  Patient:  Cesar Ayala  YOB: 1975    Assessment & Plan     Hypertension: Blood pressure controlled today.  Continue lisinopril.    Type 2 diabetes: We will decrease Mounjaro to 7.5 mg weekly due to possible side effects.  Will continue to monitor.    Anxiety: Will increase Pristiq to 100 mg daily.  Stable at this time.    Depression: Continue Pristiq.  Reassess next visit.    Hyperlipidemia: Continue Lipitor.  We will continue to monitor.    BPH: Continue Flomax.  Asymptomatic at this time         Diagnosis Orders   1. Type 2 diabetes mellitus with other specified complication, unspecified whether long term insulin use (HCC)  Hemoglobin A1C      2. Mixed hyperlipidemia  CBC with Auto Differential    Comprehensive Metabolic Panel    Lipid Panel      3. Major depressive disorder, recurrent, moderate (HCC)            Chief Complaint  Abdominal Pain (Been going on for 1 mth, heartburn , sometimes hurt to drink or eat , no change in bowl movements )    History of Present Illness   Presents for follow-up.  Reports that he has been having abdominal pain, nausea for the last month.  Denies fever, chills, diarrhea, constipation, hematochezia.  Notably, last visit dose of Mounjaro was increased to 10 mg.  I advised that we can decrease the dose and reassess if that helps.  He agrees to try that.    Objective  /78 (BP Site: Left Upper Arm, Patient Position: Sitting, BP Cuff Size: Large Adult)   Pulse 78   Resp 16   Ht 1.829 m (6')   Wt 105.7 kg (233 lb)   SpO2 92%   BMI 31.60 kg/m²   Constitutional: No acute distress.  Sits in chair comfortably  Eyes: Sclerae nonicteric. No lid lag or proptosis  HENT: External ears normal. No external lesions on the nose  Neck: No gross masses. Trachea visibly midline  Respiratory: Good air entry bilaterally.  No wheezing or crackles  Cardiovascular: Normal S1-S2.  No murmurs.  No lower

## 2025-06-15 ENCOUNTER — RESULTS FOLLOW-UP (OUTPATIENT)
Dept: INTERNAL MEDICINE | Age: 50
End: 2025-06-15

## 2025-06-17 RX ORDER — VALACYCLOVIR HYDROCHLORIDE 1 G/1
1000 TABLET, FILM COATED ORAL DAILY
Qty: 90 TABLET | Refills: 3 | Status: SHIPPED | OUTPATIENT
Start: 2025-06-17

## 2025-06-17 RX ORDER — ATORVASTATIN CALCIUM 20 MG/1
20 TABLET, FILM COATED ORAL DAILY
Qty: 90 TABLET | Refills: 3 | Status: SHIPPED | OUTPATIENT
Start: 2025-06-17

## 2025-06-17 RX ORDER — LISINOPRIL 5 MG/1
5 TABLET ORAL DAILY
Qty: 90 TABLET | Refills: 3 | Status: SHIPPED | OUTPATIENT
Start: 2025-06-17

## 2025-07-09 NOTE — TELEPHONE ENCOUNTER
Cesar called requesting a refill of the below medication which has been pended for you:     Requested Prescriptions     Pending Prescriptions Disp Refills    Tirzepatide (MOUNJARO) 7.5 MG/0.5ML SOAJ pen 6 mL 2     Sig: Inject 7.5 mg into the skin once a week       Last Appointment Date: 4/10/2025  Next Appointment Date: 10/6/2025    Allergies   Allergen Reactions    Effexor [Venlafaxine] Other (See Comments)     Muscle spasms    Shellfish-Derived Products Swelling     Throat swelling    Morphine Hives and Nausea Only     nausea

## 2025-07-14 ENCOUNTER — OFFICE VISIT (OUTPATIENT)
Dept: ORTHOPEDIC SURGERY | Age: 50
End: 2025-07-14
Payer: COMMERCIAL

## 2025-07-14 VITALS — WEIGHT: 237 LBS | HEIGHT: 72 IN | RESPIRATION RATE: 16 BRPM | BODY MASS INDEX: 32.1 KG/M2 | OXYGEN SATURATION: 100 %

## 2025-07-14 DIAGNOSIS — M17.11 PRIMARY OSTEOARTHRITIS OF RIGHT KNEE: Primary | ICD-10-CM

## 2025-07-14 DIAGNOSIS — M25.461 EFFUSION OF RIGHT KNEE: ICD-10-CM

## 2025-07-14 PROCEDURE — 99214 OFFICE O/P EST MOD 30 MIN: CPT | Performed by: PHYSICIAN ASSISTANT

## 2025-07-14 PROCEDURE — 20611 DRAIN/INJ JOINT/BURSA W/US: CPT | Performed by: PHYSICIAN ASSISTANT

## 2025-07-14 RX ORDER — EPINEPHRINE 0.3 MG/.3ML
0.3 INJECTION SUBCUTANEOUS ONCE
Qty: 0.3 ML | Refills: 0 | Status: SHIPPED | OUTPATIENT
Start: 2025-07-14 | End: 2025-07-14

## 2025-07-14 RX ORDER — METHYLPREDNISOLONE ACETATE 80 MG/ML
80 INJECTION, SUSPENSION INTRA-ARTICULAR; INTRALESIONAL; INTRAMUSCULAR; SOFT TISSUE ONCE
Status: COMPLETED | OUTPATIENT
Start: 2025-07-14 | End: 2025-07-14

## 2025-07-14 RX ORDER — LIDOCAINE HYDROCHLORIDE 10 MG/ML
6 INJECTION, SOLUTION INFILTRATION; PERINEURAL ONCE
Status: COMPLETED | OUTPATIENT
Start: 2025-07-14 | End: 2025-07-14

## 2025-07-14 RX ADMIN — METHYLPREDNISOLONE ACETATE 80 MG: 80 INJECTION, SUSPENSION INTRA-ARTICULAR; INTRALESIONAL; INTRAMUSCULAR; SOFT TISSUE at 13:49

## 2025-07-14 RX ADMIN — LIDOCAINE HYDROCHLORIDE 6 ML: 10 INJECTION, SOLUTION INFILTRATION; PERINEURAL at 13:48

## 2025-07-14 ASSESSMENT — ENCOUNTER SYMPTOMS
COLOR CHANGE: 0
ABDOMINAL DISTENTION: 0
RESPIRATORY NEGATIVE: 1
ABDOMINAL PAIN: 0
DIARRHEA: 0
VOMITING: 0
GASTROINTESTINAL NEGATIVE: 1
NAUSEA: 0
COUGH: 0
APNEA: 0
SHORTNESS OF BREATH: 0
CONSTIPATION: 0
CHEST TIGHTNESS: 0

## 2025-07-14 NOTE — PROGRESS NOTES
softball      Review of Systems   Constitutional:  Positive for activity change. Negative for appetite change, fatigue and fever.   Respiratory: Negative.  Negative for apnea, cough, chest tightness and shortness of breath.    Cardiovascular: Negative.  Negative for chest pain, palpitations and leg swelling.   Gastrointestinal: Negative.  Negative for abdominal distention, abdominal pain, constipation, diarrhea, nausea and vomiting.   Genitourinary: Negative.  Negative for difficulty urinating, dysuria and hematuria.   Musculoskeletal:  Positive for arthralgias, gait problem and joint swelling. Negative for myalgias.   Skin: Negative.  Negative for color change and rash.   Neurological:  Negative for dizziness, weakness, numbness and headaches.   Psychiatric/Behavioral: Negative.  Negative for sleep disturbance.      Objective :   Resp 16   Ht 1.829 m (6')   Wt 107.5 kg (237 lb)   SpO2 100%   BMI 32.14 kg/m²  Body mass index is 32.14 kg/m².  General: Cesar Ayala is a 50 y.o. male who is alert and oriented and sitting comfortably in our office.      Physical Exam  Gen: alert and oriented  Psych:  Appropriate affect; Appropriate knowledge base; Appropriate mood  Head: normocephalic, atraumatic   Chest: symmetric chest excursion  Pelvis: stable with ambulation  Skin: Abrasion on the anterior patella without any signs of infection.  Nails are not dystrophic.    Neurologic:  Sensation intact to light touch to sural/saphenous/SPN/DPN/plantar nerves. Motor intact to EHL/FHL/TA/GS.   Vascular:  Capillary refill is less than three seconds. Distal pulses are palpable.  There is no lymphadenopathy.    Physical Exam  Musculoskeletal:  Right knee: No medial tibial plateau pain, very mild tenderness over the patella, crepitation with knee extension, good quad tone, negative Lachman, negative valgus and varus at 0 and 30 degrees, negative anterior and posterior drawer      Radiology:   History: Chronic right knee

## 2025-07-14 NOTE — PATIENT INSTRUCTIONS
CORTISONE INJECTION CARE    The injection site should never get red, hot, or swollen and if it does the patient will contact our office right away. The patient may experience a increase in soreness the first 24-48 hours due to a cortisone flair and can take anti-inflammatories for a short period of time to reduce that soreness. The patient should not submerge the injection site in water for a minimum of 24 hours to avoid infection. This means no lakes, pools, ponds, or hot tubs for 24 hours. If the patient is diabetic the injection may increase their blood sugar for up to one week. The patient can do this cortisone injection once every 4 months as needed.                                                                                                                                                                                                                                                                                                                   PATIENTIQ:  PatientIQ helps [facility] stay in touch with you to know how you're feeling, and provides education and care instructions to you at various time points.   Your answers help your care team track your progress to provide the best care possible. PatientIQ will contact you pre-op and post-op via email or text with:  Educational Videos and Care Instructions  Questionnaires About How You're Feeling    Your participation provides you valuable education and helps [facility] continue to provide quality care to all patients. Thank you

## (undated) DEVICE — MERCY DEFIANCE ENDO KIT: Brand: MEDLINE INDUSTRIES, INC.

## (undated) DEVICE — LINE SAMP O2 6.5FT W/FEMALE CONN F/ADULT CAPNOLINE PLUS

## (undated) DEVICE — 60 ML SYRINGE REGULAR TIP: Brand: MONOJECT

## (undated) DEVICE — 1200CC GUARDIAN II: Brand: GUARDIAN

## (undated) DEVICE — CONNECTOR TBNG AUX H2O JET DISP FOR OLY 160/180 SER